# Patient Record
Sex: MALE | Race: WHITE | Employment: OTHER | URBAN - METROPOLITAN AREA
[De-identification: names, ages, dates, MRNs, and addresses within clinical notes are randomized per-mention and may not be internally consistent; named-entity substitution may affect disease eponyms.]

---

## 2017-03-10 ENCOUNTER — HOSPITAL ENCOUNTER (OUTPATIENT)
Dept: SURGERY | Age: 67
Discharge: HOME | End: 2017-03-10
Attending: OPHTHALMOLOGY | Admitting: OPHTHALMOLOGY

## 2017-03-10 VITALS
OXYGEN SATURATION: 96 % | SYSTOLIC BLOOD PRESSURE: 137 MMHG | BODY MASS INDEX: 30.29 KG/M2 | RESPIRATION RATE: 16 BRPM | DIASTOLIC BLOOD PRESSURE: 84 MMHG | WEIGHT: 193 LBS | TEMPERATURE: 97.9 F | HEART RATE: 61 BPM | HEIGHT: 67 IN

## 2017-03-10 RX ORDER — SODIUM CHLORIDE 0.9 % (FLUSH) 0.9 %
10 SYRINGE (ML) INJECTION EVERY 12 HOURS SCHEDULED
Status: DISCONTINUED | OUTPATIENT
Start: 2017-03-10 | End: 2017-03-11 | Stop reason: HOSPADM

## 2017-03-10 RX ORDER — LIDOCAINE HYDROCHLORIDE 10 MG/ML
1 INJECTION, SOLUTION EPIDURAL; INFILTRATION; INTRACAUDAL; PERINEURAL
Status: ACTIVE | OUTPATIENT
Start: 2017-03-10 | End: 2017-03-10

## 2017-03-10 RX ORDER — SODIUM CHLORIDE 0.9 % (FLUSH) 0.9 %
10 SYRINGE (ML) INJECTION PRN
Status: DISCONTINUED | OUTPATIENT
Start: 2017-03-10 | End: 2017-03-11 | Stop reason: HOSPADM

## 2017-03-10 RX ORDER — SODIUM CHLORIDE, SODIUM LACTATE, POTASSIUM CHLORIDE, CALCIUM CHLORIDE 600; 310; 30; 20 MG/100ML; MG/100ML; MG/100ML; MG/100ML
INJECTION, SOLUTION INTRAVENOUS CONTINUOUS
Status: DISCONTINUED | OUTPATIENT
Start: 2017-03-10 | End: 2017-03-11 | Stop reason: HOSPADM

## 2017-03-10 RX ADMIN — SODIUM CHLORIDE, SODIUM LACTATE, POTASSIUM CHLORIDE, CALCIUM CHLORIDE: 600; 310; 30; 20 INJECTION, SOLUTION INTRAVENOUS at 07:46

## 2017-03-10 ASSESSMENT — PAIN SCALES - GENERAL: PAINLEVEL_OUTOF10: 0

## 2017-03-10 ASSESSMENT — PAIN - FUNCTIONAL ASSESSMENT: PAIN_FUNCTIONAL_ASSESSMENT: 0-10

## 2017-03-27 ENCOUNTER — OFFICE VISIT (OUTPATIENT)
Dept: ORTHOPEDIC SURGERY | Age: 67
End: 2017-03-27

## 2017-03-27 VITALS
SYSTOLIC BLOOD PRESSURE: 152 MMHG | BODY MASS INDEX: 30.28 KG/M2 | HEART RATE: 72 BPM | HEIGHT: 67 IN | DIASTOLIC BLOOD PRESSURE: 83 MMHG | WEIGHT: 192.9 LBS

## 2017-03-27 DIAGNOSIS — M25.511 ACUTE PAIN OF RIGHT SHOULDER: Primary | ICD-10-CM

## 2017-03-27 PROCEDURE — 73030 X-RAY EXAM OF SHOULDER: CPT | Performed by: ORTHOPAEDIC SURGERY

## 2017-03-27 PROCEDURE — G8417 CALC BMI ABV UP PARAM F/U: HCPCS | Performed by: ORTHOPAEDIC SURGERY

## 2017-03-27 PROCEDURE — G8427 DOCREV CUR MEDS BY ELIG CLIN: HCPCS | Performed by: ORTHOPAEDIC SURGERY

## 2017-03-27 PROCEDURE — 1036F TOBACCO NON-USER: CPT | Performed by: ORTHOPAEDIC SURGERY

## 2017-03-27 PROCEDURE — G8598 ASA/ANTIPLAT THER USED: HCPCS | Performed by: ORTHOPAEDIC SURGERY

## 2017-03-27 PROCEDURE — 4040F PNEUMOC VAC/ADMIN/RCVD: CPT | Performed by: ORTHOPAEDIC SURGERY

## 2017-03-27 PROCEDURE — 3017F COLORECTAL CA SCREEN DOC REV: CPT | Performed by: ORTHOPAEDIC SURGERY

## 2017-03-27 PROCEDURE — 99213 OFFICE O/P EST LOW 20 MIN: CPT | Performed by: ORTHOPAEDIC SURGERY

## 2017-03-27 PROCEDURE — G8484 FLU IMMUNIZE NO ADMIN: HCPCS | Performed by: ORTHOPAEDIC SURGERY

## 2017-03-27 PROCEDURE — 1123F ACP DISCUSS/DSCN MKR DOCD: CPT | Performed by: ORTHOPAEDIC SURGERY

## 2017-03-27 RX ORDER — MELOXICAM 15 MG/1
15 TABLET ORAL DAILY
Qty: 30 TABLET | Refills: 3 | Status: SHIPPED | OUTPATIENT
Start: 2017-03-27 | End: 2017-05-22 | Stop reason: ALTCHOICE

## 2017-03-27 RX ORDER — CYCLOBENZAPRINE HCL 10 MG
TABLET ORAL
Qty: 30 TABLET | Refills: 1 | Status: SHIPPED | OUTPATIENT
Start: 2017-03-27 | End: 2017-05-22 | Stop reason: ALTCHOICE

## 2017-05-22 ENCOUNTER — OFFICE VISIT (OUTPATIENT)
Dept: ORTHOPEDIC SURGERY | Age: 67
End: 2017-05-22

## 2017-05-22 VITALS — DIASTOLIC BLOOD PRESSURE: 95 MMHG | SYSTOLIC BLOOD PRESSURE: 154 MMHG | HEART RATE: 65 BPM

## 2017-05-22 DIAGNOSIS — S46.012D ROTATOR CUFF STRAIN, LEFT, SUBSEQUENT ENCOUNTER: Primary | ICD-10-CM

## 2017-05-22 PROCEDURE — G8417 CALC BMI ABV UP PARAM F/U: HCPCS | Performed by: ORTHOPAEDIC SURGERY

## 2017-05-22 PROCEDURE — G8427 DOCREV CUR MEDS BY ELIG CLIN: HCPCS | Performed by: ORTHOPAEDIC SURGERY

## 2017-05-22 PROCEDURE — 1036F TOBACCO NON-USER: CPT | Performed by: ORTHOPAEDIC SURGERY

## 2017-05-22 PROCEDURE — 1123F ACP DISCUSS/DSCN MKR DOCD: CPT | Performed by: ORTHOPAEDIC SURGERY

## 2017-05-22 PROCEDURE — 4040F PNEUMOC VAC/ADMIN/RCVD: CPT | Performed by: ORTHOPAEDIC SURGERY

## 2017-05-22 PROCEDURE — 99213 OFFICE O/P EST LOW 20 MIN: CPT | Performed by: ORTHOPAEDIC SURGERY

## 2017-05-22 PROCEDURE — G8598 ASA/ANTIPLAT THER USED: HCPCS | Performed by: ORTHOPAEDIC SURGERY

## 2017-05-22 PROCEDURE — 3017F COLORECTAL CA SCREEN DOC REV: CPT | Performed by: ORTHOPAEDIC SURGERY

## 2017-05-25 ENCOUNTER — HOSPITAL ENCOUNTER (OUTPATIENT)
Dept: MRI IMAGING | Age: 67
Discharge: HOME | End: 2017-05-25
Attending: ORTHOPAEDIC SURGERY | Admitting: ORTHOPAEDIC SURGERY

## 2017-05-25 DIAGNOSIS — S46.012D ROTATOR CUFF STRAIN, LEFT, SUBSEQUENT ENCOUNTER: ICD-10-CM

## 2017-06-09 ENCOUNTER — OFFICE VISIT (OUTPATIENT)
Dept: ORTHOPEDIC SURGERY | Age: 67
End: 2017-06-09

## 2017-06-09 VITALS — BODY MASS INDEX: 30.29 KG/M2 | WEIGHT: 193 LBS | HEIGHT: 67 IN

## 2017-06-09 DIAGNOSIS — S46.012A ROTATOR CUFF STRAIN, LEFT, INITIAL ENCOUNTER: ICD-10-CM

## 2017-06-09 DIAGNOSIS — M25.512 PAIN OF LEFT SHOULDER REGION: Primary | ICD-10-CM

## 2017-06-09 PROCEDURE — 73030 X-RAY EXAM OF SHOULDER: CPT | Performed by: PHYSICIAN ASSISTANT

## 2017-06-09 PROCEDURE — G8598 ASA/ANTIPLAT THER USED: HCPCS | Performed by: PHYSICIAN ASSISTANT

## 2017-06-09 PROCEDURE — 3017F COLORECTAL CA SCREEN DOC REV: CPT | Performed by: PHYSICIAN ASSISTANT

## 2017-06-09 PROCEDURE — 99213 OFFICE O/P EST LOW 20 MIN: CPT | Performed by: PHYSICIAN ASSISTANT

## 2017-06-09 PROCEDURE — G8427 DOCREV CUR MEDS BY ELIG CLIN: HCPCS | Performed by: PHYSICIAN ASSISTANT

## 2017-06-09 PROCEDURE — L3670 SO ACRO/CLAV CAN WEB PRE OTS: HCPCS | Performed by: PHYSICIAN ASSISTANT

## 2017-06-09 PROCEDURE — 4040F PNEUMOC VAC/ADMIN/RCVD: CPT | Performed by: PHYSICIAN ASSISTANT

## 2017-06-09 PROCEDURE — 1123F ACP DISCUSS/DSCN MKR DOCD: CPT | Performed by: PHYSICIAN ASSISTANT

## 2017-06-09 PROCEDURE — G8417 CALC BMI ABV UP PARAM F/U: HCPCS | Performed by: PHYSICIAN ASSISTANT

## 2017-06-09 PROCEDURE — 1036F TOBACCO NON-USER: CPT | Performed by: PHYSICIAN ASSISTANT

## 2017-09-26 DIAGNOSIS — M75.122 COMPLETE TEAR OF LEFT ROTATOR CUFF: ICD-10-CM

## 2017-10-11 ENCOUNTER — HOSPITAL ENCOUNTER (OUTPATIENT)
Dept: SURGERY | Age: 67
Discharge: HOME | End: 2017-10-11
Attending: ORTHOPAEDIC SURGERY | Admitting: ORTHOPAEDIC SURGERY

## 2017-10-11 VITALS
HEART RATE: 85 BPM | WEIGHT: 190 LBS | BODY MASS INDEX: 29.82 KG/M2 | TEMPERATURE: 97 F | HEIGHT: 67 IN | DIASTOLIC BLOOD PRESSURE: 77 MMHG | OXYGEN SATURATION: 94 % | RESPIRATION RATE: 13 BRPM | SYSTOLIC BLOOD PRESSURE: 139 MMHG

## 2017-10-11 RX ORDER — MORPHINE SULFATE 2 MG/ML
1 INJECTION, SOLUTION INTRAMUSCULAR; INTRAVENOUS EVERY 5 MIN PRN
Status: DISCONTINUED | OUTPATIENT
Start: 2017-10-11 | End: 2017-10-12 | Stop reason: HOSPADM

## 2017-10-11 RX ORDER — LIDOCAINE HYDROCHLORIDE 10 MG/ML
0.3 INJECTION, SOLUTION EPIDURAL; INFILTRATION; INTRACAUDAL; PERINEURAL
Status: ACTIVE | OUTPATIENT
Start: 2017-10-11 | End: 2017-10-11

## 2017-10-11 RX ORDER — SODIUM CHLORIDE, SODIUM LACTATE, POTASSIUM CHLORIDE, CALCIUM CHLORIDE 600; 310; 30; 20 MG/100ML; MG/100ML; MG/100ML; MG/100ML
INJECTION, SOLUTION INTRAVENOUS CONTINUOUS
Status: DISCONTINUED | OUTPATIENT
Start: 2017-10-11 | End: 2017-10-12 | Stop reason: HOSPADM

## 2017-10-11 RX ORDER — DIPHENHYDRAMINE HYDROCHLORIDE 50 MG/ML
12.5 INJECTION INTRAMUSCULAR; INTRAVENOUS
Status: ACTIVE | OUTPATIENT
Start: 2017-10-11 | End: 2017-10-11

## 2017-10-11 RX ORDER — OXYCODONE HYDROCHLORIDE AND ACETAMINOPHEN 5; 325 MG/1; MG/1
2 TABLET ORAL PRN
Status: ACTIVE | OUTPATIENT
Start: 2017-10-11 | End: 2017-10-11

## 2017-10-11 RX ORDER — OXYCODONE HYDROCHLORIDE AND ACETAMINOPHEN 5; 325 MG/1; MG/1
1 TABLET ORAL PRN
Status: ACTIVE | OUTPATIENT
Start: 2017-10-11 | End: 2017-10-11

## 2017-10-11 RX ORDER — PROMETHAZINE HYDROCHLORIDE 25 MG/ML
6.25 INJECTION, SOLUTION INTRAMUSCULAR; INTRAVENOUS
Status: ACTIVE | OUTPATIENT
Start: 2017-10-11 | End: 2017-10-11

## 2017-10-11 RX ORDER — SODIUM CHLORIDE 0.9 % (FLUSH) 0.9 %
10 SYRINGE (ML) INJECTION PRN
Status: DISCONTINUED | OUTPATIENT
Start: 2017-10-11 | End: 2017-10-12 | Stop reason: HOSPADM

## 2017-10-11 RX ORDER — OXYCODONE HYDROCHLORIDE AND ACETAMINOPHEN 5; 325 MG/1; MG/1
1 TABLET ORAL EVERY 6 HOURS PRN
Qty: 28 TABLET | Refills: 0 | Status: SHIPPED | OUTPATIENT
Start: 2017-10-11 | End: 2017-10-11 | Stop reason: HOSPADM

## 2017-10-11 RX ORDER — ONDANSETRON 2 MG/ML
4 INJECTION INTRAMUSCULAR; INTRAVENOUS
Status: ACTIVE | OUTPATIENT
Start: 2017-10-11 | End: 2017-10-11

## 2017-10-11 RX ORDER — ACETAMINOPHEN 10 MG/ML
1000 INJECTION, SOLUTION INTRAVENOUS ONCE
Status: COMPLETED | OUTPATIENT
Start: 2017-10-11 | End: 2017-10-11

## 2017-10-11 RX ORDER — MORPHINE SULFATE 2 MG/ML
2 INJECTION, SOLUTION INTRAMUSCULAR; INTRAVENOUS EVERY 5 MIN PRN
Status: DISCONTINUED | OUTPATIENT
Start: 2017-10-11 | End: 2017-10-12 | Stop reason: HOSPADM

## 2017-10-11 RX ORDER — SODIUM CHLORIDE 0.9 % (FLUSH) 0.9 %
10 SYRINGE (ML) INJECTION EVERY 12 HOURS SCHEDULED
Status: DISCONTINUED | OUTPATIENT
Start: 2017-10-11 | End: 2017-10-12 | Stop reason: HOSPADM

## 2017-10-11 RX ORDER — MEPERIDINE HYDROCHLORIDE 50 MG/ML
12.5 INJECTION INTRAMUSCULAR; INTRAVENOUS; SUBCUTANEOUS EVERY 5 MIN PRN
Status: DISCONTINUED | OUTPATIENT
Start: 2017-10-11 | End: 2017-10-12 | Stop reason: HOSPADM

## 2017-10-11 RX ORDER — LABETALOL HYDROCHLORIDE 5 MG/ML
5 INJECTION, SOLUTION INTRAVENOUS EVERY 10 MIN PRN
Status: DISCONTINUED | OUTPATIENT
Start: 2017-10-11 | End: 2017-10-12 | Stop reason: HOSPADM

## 2017-10-11 RX ORDER — HYDRALAZINE HYDROCHLORIDE 20 MG/ML
5 INJECTION INTRAMUSCULAR; INTRAVENOUS EVERY 10 MIN PRN
Status: DISCONTINUED | OUTPATIENT
Start: 2017-10-11 | End: 2017-10-12 | Stop reason: HOSPADM

## 2017-10-11 RX ORDER — HYDROCODONE BITARTRATE AND ACETAMINOPHEN 5; 325 MG/1; MG/1
1 TABLET ORAL EVERY 4 HOURS PRN
Qty: 42 TABLET | Refills: 0 | Status: SHIPPED | OUTPATIENT
Start: 2017-10-11 | End: 2017-10-18

## 2017-10-11 RX ADMIN — ACETAMINOPHEN 1000 MG: 10 INJECTION, SOLUTION INTRAVENOUS at 06:55

## 2017-10-11 RX ADMIN — SODIUM CHLORIDE, SODIUM LACTATE, POTASSIUM CHLORIDE, CALCIUM CHLORIDE: 600; 310; 30; 20 INJECTION, SOLUTION INTRAVENOUS at 06:54

## 2017-10-11 ASSESSMENT — PAIN SCALES - GENERAL
PAINLEVEL_OUTOF10: 0

## 2017-10-11 ASSESSMENT — PAIN - FUNCTIONAL ASSESSMENT: PAIN_FUNCTIONAL_ASSESSMENT: 0-10

## 2017-10-11 NOTE — PROGRESS NOTES
Block Time Out with MAGNUS Mitchell      Verified   Correct Pt.   Correct   Correct Procedure  Correct Site  Correct Extremity

## 2017-10-11 NOTE — PROGRESS NOTES
Patient ready for discharge now. Needs prescription changed. Percocet makes patient sick. Waiting for Dr. Mattie Woodruff to come out of O.R. To vic different prescription.

## 2017-10-11 NOTE — OP NOTE
Diagnostic Shoulder Arthroscopy with Rotator Cuff Repair, Biceps Tenodesis and Subacromial Decompression       Kayla Gonzalez (1950)    Date of Surgery- 10/11/2017      Preoperative Diagnosis-   1. Recurrent rotator cuff tear left shoulder           2. Outlet impingement left shoulder                                            Postoperative Diagnosis-  1. Recurrent rotator cuff tear left shoulder           2. Outlet impingement left shoulder       3. Long head biceps tendon tear    Procedure-   1. Diagnostic arthroscopy left shoulder             2.  Arthroscopic rotator cuff repair left shoulder ([id number])    3. Subacromial decompression (CPT- [id number])    4. Arthroscopic Long head biceps tenodesis ([id number])      Surgeon-  Dian Mariee    Primary Assistant- Pippa Barroso PA-C, ATC. Anesthesia- [facility]    Implants- Depuy Mitek Healix 4.5 mm, quantity # 2; Andreia 7 x 23 mm, quantity # 1    The physician assistant was necessary in the surgical case as described above. The presence of a skilled physician assistant was integral for adequate visualization, suture management, knot tying and wound closure. Indications for Operation  Persistent shoulder pain with an MRI confirmed rotator cuff tear. The patient chose to proceed with the aforementioned procedures. At no time were any guarantees implied or stated. Site Marking and Surgical Prep  The patient was seen in the holding area and the appropriate extremity marked with a pen. Interscalene block was administered by the anesthesia department. The patient was taken to the operative suite, identified, placed on the operating room table in the supine position. After induction of general anesthesia, the patient was placed in the beach chair position with all bony prominences adequately padded and the head and neck anatomically supported. Examination  Under Anesthesia.   Full symmetric range of motion, no instability    Diagnostic Arthroscopy  The upper extremity from the neck to fingertips was prepped with Hibiclens and alcohol and draped in the standard fashion. The forearm was well padded and secured in the [employer] Spider extremity positioning device. A standard midglenoid posterior portal was established. The trocar and cannula were inserted. The trocar was removed and the arthroscope and inflow inserted. Systematic arthroscopy was performed and the findings are summarized below. 1.  Superior Labrum/Biceps Tendon-  Extensive fraying and tearing of the  biceps tendon. No evidence of labral fraying  2. Anterior/Posterior Labrum- Intact without fraying  3. Rotator Cuff- The subscapularis tendon was intact. There was a full thickness tear of the supraspinatus tendon  4. Inferior Axillary Recess-  No loose bodies  5. Articular Surfaces-  Intact. There was some posterosuperior labral ossification    Intra-articular Debridement   None performed. Subacromial Decompression  The trocar and cannula were redirected to the subacromial space. The arthroscope and inflow were inserted. A lateral portal was established after localizing the subacromial space with a spinal needle. Using a both a shaver and a radiofrequency probe, the bursa and the tissue beneath the acromion were removed. The coracromial ligament was divided. A 5.5 mm bur was placed through the lateral portal and the anterior aspect of the acromion was removed. The arthroscope was then switched to the lateral portal and with the bur posteriorly, a smooth flat acromion was created. The arm was abducted to 90 degrees and internally and externally rotated. There was no evidence of outlet impingement. Long Head Biceps Tenodesis (Interference screw)  A suture was placed around the biceps tendon to maintain length. The biceps tendon was tenotomized from its origin on the supraglenoid tubercle.   With the arthroscope in the lateral portal, the bicipital sheath was identified and opened. Hemostasis was controlled with electrocautery. An accessory portal was made above the axillary crease. A guide pin was drilled distal to the bicipital groove. An acorn reamer was used to create a 25-30 mm socket. A bur was used to create a small notch in the inferior socket. The tendon fork was used to guide the tendon into the socket. A wire was placed through the cannulated fork to maintain the tendon within the socket. A Andreia interference screw was carefully inserted taking care not to damage the tendon. The tendon was probed to confirm that the tendon was securely fixed within the socket. The remaining proximal tendon was excised    Rotator Cuff Repair    Hypertrophic bursa was debrided. A recurrent, delaminated supraspinatus tendon tear was identified. There was some soft tissue loss but the remaining tissue was fair in quality. The greater tuberosity was abraded to a bleeding bony surface. Two triple loaded, 4.5 mm anchors were placed in the mid portion of the greater tuberosity. The suture limbs were retrieved in a simple fashion using a locking lasso stitch. Sutures were isolated and tied using a series of alternating half hitches reversing both the post and loop. The repair was under minimal tension. The arm was abducted, internally and externally rotated. Threre was no evidence of impingement. Closure  The shoulder was drained. Arthroscopic equipment was removed and the portals closed with 3-0 Nylon. Sterile dressings were applied. A sling was applied. The patient was awakened and taken to the postoperative area in stable condition.

## 2017-10-11 NOTE — PROCEDURES
Zaire Veliz   1950  78 y.o. INTERSCALENE NERVE BLOCK NOTE    Pt agrees to risks, benefits and alternatives of block. Surgical procedure: shuolder  Side: left  Requested per Dr. Romulo Meneses done with :  Pt and Lorelei    Pt sedated with Versed 4mg                            Fentanyl 0ug      Propofol 0  Monitor on  Supine  Prepped with alcohol     No-Lidocaine 1% used for topical local anesthetic     No-  24 gauge 25 mm Stimuplex needle used     Yes- 22 gauge 50 mm Stimuplex needle used    Initial stimulation of 1.5 ma at 2 HZ decreased to 0.6 ma before loss of stimulation. Good stimulation of shoulder/arm. Bupivacaine . 50% 35cc injected in 5 cc increments after negative aspiration each time. Yes- Epinephrine 1:200,000 included plus decadron 10 mg  Pt tolerated procedure well. No complications.   Comments:       Denzel Fairchild

## 2017-10-11 NOTE — PROGRESS NOTES
Pt tolerated LUE       Nerve block well  Without adverse reactions/ no dizziness/tinnitus/ pain- pt was premedicated with          4mg        Versed prior to receiving the nerve block. VSS- Sao2 monitored during the entire procedure- maintained WNL during block procedure.  95% on room air
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
## 2017-10-16 ENCOUNTER — OFFICE VISIT (OUTPATIENT)
Dept: ORTHOPEDIC SURGERY | Age: 67
End: 2017-10-16

## 2017-10-16 ENCOUNTER — HOSPITAL ENCOUNTER (OUTPATIENT)
Dept: PHYSICAL THERAPY | Age: 67
Discharge: HOME | End: 2017-09-30
Admitting: ORTHOPAEDIC SURGERY

## 2017-10-16 ENCOUNTER — HOSPITAL ENCOUNTER (OUTPATIENT)
Dept: PHYSICAL THERAPY | Age: 67
Discharge: HOME | End: 2017-10-16
Admitting: ORTHOPAEDIC SURGERY

## 2017-10-16 VITALS — HEIGHT: 67 IN | BODY MASS INDEX: 29.83 KG/M2 | WEIGHT: 190.04 LBS

## 2017-10-16 DIAGNOSIS — Z98.890 S/P ROTATOR CUFF REPAIR: Primary | ICD-10-CM

## 2017-10-16 PROCEDURE — 99024 POSTOP FOLLOW-UP VISIT: CPT | Performed by: ORTHOPAEDIC SURGERY

## 2017-10-16 NOTE — FLOWSHEET NOTE
for proper ROM for normal function with self care, reaching, carrying, lifting, house/yardwork, driving/computer work    Modalities:  Electrical Stimulation to L shoulder for pain control. Waveform: Premod; Cycle Time: Continuous; Frequency: 80/150 Hz; Treatment time: 10 min with ice. Charges:  Timed Code Treatment Minutes: 25   Total Treatment Minutes: 50     [x] EVAL (LOW) 29069 (typically 20 minutes face-to-face)  [x] SC(73767) x  2   [] IONTO  [] NMR (79525) x      [] VASO  [] Manual (90826) x       [] Other:  [] TA x       [] Mech Traction (72720)  [] ES(attended) (92293)      [x] ES (un) (28680):     GOALS:  Patient stated goal: use of LUE     Therapist goals for Patient:   Short Term Goals: To be achieved in: 2 weeks  1. Independent in HEP and progression per patient tolerance, in order to prevent re-injury. 2. Patient will have a decrease in pain to facilitate improvement in movement, function, and ADLs as indicated by Functional Deficits.     Long Term Goals: To be achieved in: 12 weeks  1. Disability index score of 40% or less for the DASH to assist with reaching prior level of function. 2. Patient will demonstrate increased AROM to WNL to allow for proper joint functioning as indicated by patients Functional Deficits. 3. Patient will demonstrate an increase in Strength to ?4+/5 to allow for proper functional mobility as indicated by patients Functional Deficits. 4. Patient will return to all functional activities without increased symptoms or restriction. 5. Pt will D/C sling and exhibit normal use of LUE with no ? pain (patient specific functional goal)                   Progression Towards Functional goals:  [] Patient is progressing as expected towards functional goals listed. [] Progression is slowed due to complexities listed. [] Progression has been slowed due to co-morbidities.   [x] Plan just implemented, too soon to assess goals progression  [] Other:     ASSESSMENT:  See eval    Treatment/Activity Tolerance:  [x] Patient tolerated treatment well [] Patient limited by fatique  [] Patient limited by pain  [] Patient limited by other medical complications  [] Other:     Prognosis: [x] Good [] Fair  [] Poor    Patient Requires Follow-up: [x] Yes  [] No    PLAN: See eval  [] Continue per plan of care [] Alter current plan (see comments)  [x] Plan of care initiated [] Hold pending MD visit [] Discharge    Electronically signed by: Joshua Shukla PT

## 2017-10-16 NOTE — PLAN OF CARE
Objects Goal Status (): At least 20 percent but less than 40 percent impaired, limited or restricted    ASSESSMENT:   Functional Impairments   [x]Noted spinal or UE joint hypomobility   []Noted spinal or UE joint hypermobility   [x]Decreased UE functional ROM   [x]Decreased UE functional strength   [x]Abnormal reflexes/sensation/myotomal/dermatomal deficits   [x]Decreased RC/scapular/core strength and neuromuscular control   []other:      Functional Activity Limitations (from functional questionnaire and intake)   [x]Reduced ability to tolerate prolonged functional positions   [x]Reduced ability or difficulty with changes of positions or transfers between positions   [x]Reduced ability to maintain good posture and demonstrate good body mechanics with sitting, bending, and lifting   [x] Reduced ability or tolerance with driving and/or computer work   [x]Reduced ability to sleep   [x]Reduced ability to perform lifting, reaching, carrying tasks   [x]Reduced ability to tolerate impact through UE   [x]Reduced ability to reach behind back   [x]Reduced ability to  or hold objects   [x]Reduced ability to throw or toss an object   []other:    Participation Restrictions   [x]Reduced participation in self care activities   [x]Reduced participation in home management activities   [x]Reduced participation in work activities   [x]Reduced participation in social activities. [x]Reduced participation in sport/recreation activities. Classification:   [x]Signs/symptoms consistent with post-surgical status including decreased ROM, strength and function.   []Signs/symptoms consistent with joint sprain/strain   []Signs/symptoms consistent with shoulder impingement   []Signs/symptoms consistent with shoulder/elbow/wrist tendinopathy   []Signs/symptoms consistent with Rotator cuff tear   []Signs/symptoms consistent with labral tear   []Signs/symptoms consistent with postural dysfunction    []Signs/symptoms consistent with Glenohumeral IR Deficit - <45 degrees   []Signs/symptoms consistent with facet dysfunction of cervical/thoracic spine    []Signs/symptoms consistent with pathology which may benefit from Dry needling     []other:     Prognosis/Rehab Potential:      []Excellent   [x]Good    []Fair   []Poor    Tolerance of evaluation/treatment:    []Excellent   [x]Good    []Fair   []Poor    Physical Therapy Evaluation Complexity Justification  [x] A history of present problem with:  [] no personal factors and/or comorbidities that impact the plan of care;  [x]1-2 personal factors and/or comorbidities that impact the plan of care  []3 personal factors and/or comorbidities that impact the plan of care  [x] An examination of body systems using standardized tests and measures addressing any of the following: body structures and functions (impairments), activity limitations, and/or participation restrictions;:  [] a total of 1-2 or more elements   [x] a total of 3 or more elements   [] a total of 4 or more elements   [x] A clinical presentation with:  [x] stable and/or uncomplicated characteristics   [] evolving clinical presentation with changing characteristics  [] unstable and unpredictable characteristics;   [x] Clinical decision making of [x] low, [] moderate, [] high complexity using standardized patient assessment instrument and/or measurable assessment of functional outcome.     [x] EVAL (LOW) 18117 (typically 20 minutes face-to-face)  [] EVAL (MOD) 89442 (typically 30 minutes face-to-face)  [] EVAL (HIGH) 37729 (typically 45 minutes face-to-face)  [] RE-EVAL     PLAN:  Frequency/Duration:  1-2 days per week for 12 Weeks:  INTERVENTIONS:  [x] Therapeutic exercise including: strength training, ROM, for Upper extremity and core   [x]  NMR activation and proprioception for UE, scap and Core   [x] Manual therapy as indicated for shoulder, scapula and spine to include: Dry Needling/IASTM, STM, PROM, Gr I-IV mobilizations, manipulation. [x] Modalities as needed that may include: thermal agents, E-stim, Biofeedback, US, iontophoresis as indicated  [x] Patient education on joint protection, postural re-education, activity modification, progression of HEP. HEP instruction: Pt provided with handout and demonstrated and verbalized understanding. (see scanned forms)    GOALS:  Patient stated goal: use of LUE    Therapist goals for Patient:   Short Term Goals: To be achieved in: 2 weeks  1. Independent in HEP and progression per patient tolerance, in order to prevent re-injury. 2. Patient will have a decrease in pain to facilitate improvement in movement, function, and ADLs as indicated by Functional Deficits. Long Term Goals: To be achieved in: 12 weeks  1. Disability index score of 40% or less for the DASH to assist with reaching prior level of function. 2. Patient will demonstrate increased AROM to WNL to allow for proper joint functioning as indicated by patients Functional Deficits. 3. Patient will demonstrate an increase in Strength to ?4+/5 to allow for proper functional mobility as indicated by patients Functional Deficits. 4. Patient will return to all functional activities without increased symptoms or restriction.    5. Pt will D/C sling and exhibit normal use of LUE with no ? pain (patient specific functional goal)       Electronically signed by:  Luellen Libman, PT

## 2017-10-18 ENCOUNTER — HOSPITAL ENCOUNTER (OUTPATIENT)
Dept: PHYSICAL THERAPY | Age: 67
Discharge: HOME | End: 2017-10-18
Admitting: ORTHOPAEDIC SURGERY

## 2017-10-18 NOTE — FLOWSHEET NOTE
Hz; Treatment time: 10 min with ice. Charges:  Timed Code Treatment Minutes: 34   Total Treatment Minutes: 44     [] EVAL (LOW) 66754 (typically 20 minutes face-to-face)  [x] KG(24665) x  1   [] IONTO  [] NMR (52482) x      [] VASO  [x] Manual (04013) x  1    [] Other:  [] TA x       [] Mech Traction (47030)  [] ES(attended) (20009)      [x] ES (un) (52645):     GOALS:  Patient stated goal: use of LUE     Therapist goals for Patient:   Short Term Goals: To be achieved in: 2 weeks  1. Independent in HEP and progression per patient tolerance, in order to prevent re-injury. 2. Patient will have a decrease in pain to facilitate improvement in movement, function, and ADLs as indicated by Functional Deficits.     Long Term Goals: To be achieved in: 12 weeks  1. Disability index score of 40% or less for the DASH to assist with reaching prior level of function. 2. Patient will demonstrate increased AROM to WNL to allow for proper joint functioning as indicated by patients Functional Deficits. 3. Patient will demonstrate an increase in Strength to ?4+/5 to allow for proper functional mobility as indicated by patients Functional Deficits. 4. Patient will return to all functional activities without increased symptoms or restriction. 5. Pt will D/C sling and exhibit normal use of LUE with no ? pain (patient specific functional goal)                   Progression Towards Functional goals:  [] Patient is progressing as expected towards functional goals listed. [] Progression is slowed due to complexities listed. [] Progression has been slowed due to co-morbidities. [x] Plan just implemented, too soon to assess goals progression  [] Other:     ASSESSMENT:  Pt tolerated treatment well exhibiting good ROM. Pt will continue to benefit from ? ROM, gentle strengthening as tolerated and ? myofascial tightness throughout pedro scap musculature.      Treatment/Activity Tolerance:  [x] Patient tolerated treatment well [] Patient limited by fatique  [] Patient limited by pain  [] Patient limited by other medical complications  [] Other:     Prognosis: [x] Good [] Fair  [] Poor    Patient Requires Follow-up: [x] Yes  [] No    PLAN: See eval  [x] Continue per plan of care [] Alter current plan (see comments)  [] Plan of care initiated [] Hold pending MD visit [] Discharge    Electronically signed by: Marine Najjar PT

## 2017-10-23 ENCOUNTER — HOSPITAL ENCOUNTER (OUTPATIENT)
Dept: PHYSICAL THERAPY | Age: 67
Discharge: HOME | End: 2017-10-23
Admitting: ORTHOPAEDIC SURGERY

## 2017-10-23 NOTE — FLOWSHEET NOTE
[address] [place], [address]                Physical Therapy Daily Treatment Note  Date:  10/23/2017    Patient Name:  Denise Lane    :  1950  MRN: [id number]  Restrictions/Precautions:    Medical/Treatment Diagnosis Information:  · Diagnosis: Z98.890 L RTC repair, M25.512 L shoulder pain  · Treatment Diagnosis: s/p L RTC repair, SAD, biceps tenodesis   Insurance/Certification information:   Medicare  Physician Information:  Referring Practitioner: Dr. Elodia Garcia of care signed (Y/N): Y    Date of Patient follow up with Physician:     G-Code (if applicable):      Date G-Code Applied:  10/16/17       Progress Note: []  Yes  [x]  No  Next due by: Visit #10      Latex Allergy:  [x]NO      []YES  Preferred Language for Healthcare:   [x]English       []other:    Visit # Insurance Allowable   3 Med nec     Pain level:  2-3/10     SUBJECTIVE:  Pt reports his shoulder feels good overall.       OBJECTIVE:   Observation:    Pt has sling donned appropriately   Test measurements:     10/18: Flex: 128 ° , ER: 34 °     RESTRICTIONS/PRECAUTIONS: sling x6 wks, PROM only x6 wks    Exercises/Interventions:   Therapeutic Ex Sets/sec Reps Notes HEP   Seated self ER S 5\" 10  x   Table slide flexion 10\" 10  x   Shrugs 3 10  x   Scap squeeze 5\" 10  x   Assisted bicep curl 3 10  x   Wrist and hand ROM    x   Iso: ER/IR, ext, ABD 5\" 10  x    2 30 green                                                                   Pt edu:               Manual Intervention       PROM  8'      STM pec, UT, pedro scap 4'                                         NMR re-education                                                                   Therapeutic Exercise and NMR EXR  [x] (72723) Provided verbal/tactile cueing for activities related to strengthening, flexibility, endurance, ROM  for improvements in scapular, scapulothoracic with ice. Charges:  Timed Code Treatment Minutes: 34   Total Treatment Minutes: 44     [] EVAL (LOW) 95817 (typically 20 minutes face-to-face)  [x] ZQ(88072) x  1   [] IONTO  [] NMR (59479) x      [] VASO  [x] Manual (68693) x  1    [] Other:  [] TA x       [] Mech Traction (16831)  [] ES(attended) (33012)      [x] ES (un) (58726):     GOALS:  Patient stated goal: use of LUE     Therapist goals for Patient:   Short Term Goals: To be achieved in: 2 weeks  1. Independent in HEP and progression per patient tolerance, in order to prevent re-injury. 2. Patient will have a decrease in pain to facilitate improvement in movement, function, and ADLs as indicated by Functional Deficits.     Long Term Goals: To be achieved in: 12 weeks  1. Disability index score of 40% or less for the DASH to assist with reaching prior level of function. 2. Patient will demonstrate increased AROM to WNL to allow for proper joint functioning as indicated by patients Functional Deficits. 3. Patient will demonstrate an increase in Strength to ?4+/5 to allow for proper functional mobility as indicated by patients Functional Deficits. 4. Patient will return to all functional activities without increased symptoms or restriction. 5. Pt will D/C sling and exhibit normal use of LUE with no ? pain (patient specific functional goal)                   Progression Towards Functional goals:  [x] Patient is progressing as expected towards functional goals listed. [] Progression is slowed due to complexities listed. [] Progression has been slowed due to co-morbidities. [] Plan just implemented, too soon to assess goals progression  [] Other:     ASSESSMENT:  Pt tolerated treatment well exhibiting good ROM. Pt will continue to benefit from ? ROM, gentle strengthening as tolerated and ? myofascial tightness throughout pedro scap musculature.      Treatment/Activity Tolerance:  [x] Patient tolerated treatment well [] Patient limited by

## 2017-10-25 ENCOUNTER — HOSPITAL ENCOUNTER (OUTPATIENT)
Dept: PHYSICAL THERAPY | Age: 67
Discharge: HOME | End: 2017-10-25
Admitting: ORTHOPAEDIC SURGERY

## 2017-10-25 NOTE — FLOWSHEET NOTE
[address] [place], [address]                Physical Therapy Daily Treatment Note  Date:  10/25/2017    Patient Name:  Huber Guerra    :  1950  MRN: [id number]  Restrictions/Precautions:    Medical/Treatment Diagnosis Information:  · Diagnosis: Z98.890 L RTC repair, M25.512 L shoulder pain  · Treatment Diagnosis: s/p L RTC repair, SAD, biceps tenodesis   Insurance/Certification information:   Medicare  Physician Information:  Referring Practitioner: Dr. Adalberto Taylor of care signed (Y/N): Y    Date of Patient follow up with Physician:     G-Code (if applicable):      Date G-Code Applied:  10/16/17       Progress Note: []  Yes  [x]  No  Next due by: Visit #10      Latex Allergy:  [x]NO      []YES  Preferred Language for Healthcare:   [x]English       []other:    Visit # Insurance Allowable   4 Med nec     Pain level:  1-2/10     SUBJECTIVE:  Pt reports no new complaints.      OBJECTIVE:   Observation:    Pt has sling donned appropriately   Test measurements:     10/18: Flex: 128 ° , ER: 34 °     RESTRICTIONS/PRECAUTIONS: sling x6 wks, PROM only x6 wks    Exercises/Interventions:   Therapeutic Ex Sets/sec Reps Notes HEP   Naima's 5'        Table ER S 5\"  5\" 10  10  X  x   Table slide flexion 10\" 20  x   Shrugs 3 10  x   Scap squeeze 5\" 10  x   Assisted bicep curl 3 10  x   Wrist and hand ROM    x   Iso: ER/IR, ext, ABD 5\" 10  x    2 30 green                                                                   Pt edu:               Manual Intervention       PROM  8'      STM pec, UT, pedro scap 4'                                         NMR re-education                                                                   Therapeutic Exercise and NMR EXR  [x] (43654) Provided verbal/tactile cueing for activities related to strengthening, flexibility, endurance, ROM  for improvements in scapular, scapulothoracic and UE control with self care, reaching, carrying, lifting, house/yardwork, driving/computer work.    [] (79821) Provided verbal/tactile cueing for activities related to improving balance, coordination, kinesthetic sense, posture, motor skill, proprioception  to assist with  scapular, scapulothoracic and UE control with self care, reaching, carrying, lifting, house/yardwork, driving/computer work. Therapeutic Activities:    [] (74885 or 31968) Provided verbal/tactile cueing for activities related to improving balance, coordination, kinesthetic sense, posture, motor skill, proprioception and motor activation to allow for proper function of scapular, scapulothoracic and UE control with self care, carrying, lifting, driving/computer work. Home Exercise Program:    [x] (29616) Reviewed/Progressed HEP activities related to strengthening, flexibility, endurance, ROM of scapular, scapulothoracic and UE control with self care, reaching, carrying, lifting, house/yardwork, driving/computer work  [] (88877) Reviewed/Progressed HEP activities related to improving balance, coordination, kinesthetic sense, posture, motor skill, proprioception of scapular, scapulothoracic and UE control with self care, reaching, carrying, lifting, house/yardwork, driving/computer work      Manual Treatments:  PROM / STM / Oscillations-Mobs:  G-I, II, III, IV (PA's, Inf., Post.)  [x] (23239) Provided manual therapy to mobilize soft tissue/joints of cervical/CT, scapular GHJ and UE for the purpose of modulating pain, promoting relaxation,  increasing ROM, reducing/eliminating soft tissue swelling/inflammation/restriction, improving soft tissue extensibility and allowing for proper ROM for normal function with self care, reaching, carrying, lifting, house/yardwork, driving/computer work    Modalities:  Electrical Stimulation to L shoulder for pain control. Waveform: Premod;  Cycle Time: Continuous; Frequency: 80/150 Hz; Patient limited by fatique  [] Patient limited by pain  [] Patient limited by other medical complications  [] Other:     Prognosis: [x] Good [] Fair  [] Poor    Patient Requires Follow-up: [x] Yes  [] No    PLAN: See eval  [x] Continue per plan of care [] Alter current plan (see comments)  [] Plan of care initiated [] Hold pending MD visit [] Discharge    Electronically signed by: Neisha Townsend PT

## 2017-11-01 ENCOUNTER — HOSPITAL ENCOUNTER (OUTPATIENT)
Dept: PHYSICAL THERAPY | Age: 67
Discharge: HOME | End: 2017-11-01
Admitting: ORTHOPAEDIC SURGERY

## 2017-11-01 ENCOUNTER — HOSPITAL ENCOUNTER (OUTPATIENT)
Dept: PHYSICAL THERAPY | Age: 67
Discharge: HOME | End: 2017-11-30
Attending: ORTHOPAEDIC SURGERY | Admitting: ORTHOPAEDIC SURGERY

## 2017-11-01 NOTE — FLOWSHEET NOTE
[facility], [address]                Physical Therapy Daily Treatment Note  Date:  2017    Patient Name:  Kayla Garcia    :  1950  MRN: [id number]  Restrictions/Precautions:    Medical/Treatment Diagnosis Information:  · Diagnosis: Z98.890 L RTC repair, M25.512 L shoulder pain  · Treatment Diagnosis: s/p L RTC repair, SAD, biceps tenodesis 60  Insurance/Certification information:   Medicare  Physician Information:  Referring Practitioner: Dr. Miroslava Reece of care signed (Y/N): Y    Date of Patient follow up with Physician:     G-Code (if applicable):      Date G-Code Applied:  10/16/17       Progress Note: []  Yes  [x]  No  Next due by: Visit #10      Latex Allergy:  [x]NO      []YES  Preferred Language for Healthcare:   [x]English       []other:    Visit # Insurance Allowable   5 Med nec     Pain level:  1-2/10     SUBJECTIVE:  Pt reports just some stiffness and soreness this morning. Pt will be 4 wks post op on .      OBJECTIVE:   Observation:    Pt has sling donned appropriately   Test measurements:     10/18: Flex: 128 ° , ER: 34 °     RESTRICTIONS/PRECAUTIONS: sling x6 wks, PROM only x6 wks    Exercises/Interventions:   Therapeutic Ex Sets/sec Reps Notes HEP   Naima's 5'        Table ER S 5\"  5\" 10  10  X  x   Table slide flexion 10\" 20  x   Shrugs 3 10  x   Scap squeeze 5\" 10  x   Assisted bicep curl  Bicep iso 3  5\" 10  10  X  x       x   Iso: ER/IR, ext, ABD, flex 5\" 10  x    2 30 green    Cane flex S 5\" 10  x                                                           Pt edu:               Manual Intervention       PROM  8'       4'                                         NMR re-education                                                                   Therapeutic Exercise and NMR EXR  [x] (82710) Provided verbal/tactile cueing for activities related to strengthening,

## 2017-11-08 ENCOUNTER — HOSPITAL ENCOUNTER (OUTPATIENT)
Dept: PHYSICAL THERAPY | Age: 67
Discharge: HOME | End: 2017-11-08
Admitting: ORTHOPAEDIC SURGERY

## 2017-11-08 NOTE — FLOWSHEET NOTE
flexibility, endurance, ROM  for improvements in scapular, scapulothoracic and UE control with self care, reaching, carrying, lifting, house/yardwork, driving/computer work.    [] (61340) Provided verbal/tactile cueing for activities related to improving balance, coordination, kinesthetic sense, posture, motor skill, proprioception  to assist with  scapular, scapulothoracic and UE control with self care, reaching, carrying, lifting, house/yardwork, driving/computer work. Therapeutic Activities:    [] (89338 or 11078) Provided verbal/tactile cueing for activities related to improving balance, coordination, kinesthetic sense, posture, motor skill, proprioception and motor activation to allow for proper function of scapular, scapulothoracic and UE control with self care, carrying, lifting, driving/computer work. Home Exercise Program:    [x] (71748) Reviewed/Progressed HEP activities related to strengthening, flexibility, endurance, ROM of scapular, scapulothoracic and UE control with self care, reaching, carrying, lifting, house/yardwork, driving/computer work  [] (52664) Reviewed/Progressed HEP activities related to improving balance, coordination, kinesthetic sense, posture, motor skill, proprioception of scapular, scapulothoracic and UE control with self care, reaching, carrying, lifting, house/yardwork, driving/computer work      Manual Treatments:  PROM / STM / Oscillations-Mobs:  G-I, II, III, IV (PA's, Inf., Post.)  [x] (13144) Provided manual therapy to mobilize soft tissue/joints of cervical/CT, scapular GHJ and UE for the purpose of modulating pain, promoting relaxation,  increasing ROM, reducing/eliminating soft tissue swelling/inflammation/restriction, improving soft tissue extensibility and allowing for proper ROM for normal function with self care, reaching, carrying, lifting, house/yardwork, driving/computer work    Modalities:  Electrical Stimulation to L shoulder for pain control.  Waveform: Premod; Cycle Time: Continuous; Frequency: 80/150 Hz; Treatment time: 10 min with ice. Charges:  Timed Code Treatment Minutes: 38   Total Treatment Minutes: 48     [] EVAL (LOW) 79459 (typically 20 minutes face-to-face)  [x] OA(59896) x  2   [] IONTO  [] NMR (64558) x      [] VASO  [x] Manual (36719) x  1    [] Other:  [] TA x       [] Mech Traction (84880)  [] ES(attended) (98275)      [x] ES (un) (24698):     GOALS:  Patient stated goal: use of LUE     Therapist goals for Patient:   Short Term Goals: To be achieved in: 2 weeks  1. Independent in HEP and progression per patient tolerance, in order to prevent re-injury. 2. Patient will have a decrease in pain to facilitate improvement in movement, function, and ADLs as indicated by Functional Deficits.     Long Term Goals: To be achieved in: 12 weeks  1. Disability index score of 40% or less for the DASH to assist with reaching prior level of function. 2. Patient will demonstrate increased AROM to WNL to allow for proper joint functioning as indicated by patients Functional Deficits. 3. Patient will demonstrate an increase in Strength to ?4+/5 to allow for proper functional mobility as indicated by patients Functional Deficits. 4. Patient will return to all functional activities without increased symptoms or restriction. 5. Pt will D/C sling and exhibit normal use of LUE with no ? pain (patient specific functional goal)                   Progression Towards Functional goals:  [x] Patient is progressing as expected towards functional goals listed. [] Progression is slowed due to complexities listed. [] Progression has been slowed due to co-morbidities. [] Plan just implemented, too soon to assess goals progression  [] Other:     ASSESSMENT:  Pt tolerated treatment well exhibiting good ROM. Pt will continue to benefit from ? ROM, gentle strengthening as tolerated and ? myofascial tightness throughout pedro scap musculature.      Treatment/Activity

## 2017-11-15 ENCOUNTER — HOSPITAL ENCOUNTER (OUTPATIENT)
Dept: PHYSICAL THERAPY | Age: 67
Discharge: HOME | End: 2017-11-15
Admitting: ORTHOPAEDIC SURGERY

## 2017-11-15 NOTE — FLOWSHEET NOTE
improvements in scapular, scapulothoracic and UE control with self care, reaching, carrying, lifting, house/yardwork, driving/computer work.    [] (88753) Provided verbal/tactile cueing for activities related to improving balance, coordination, kinesthetic sense, posture, motor skill, proprioception  to assist with  scapular, scapulothoracic and UE control with self care, reaching, carrying, lifting, house/yardwork, driving/computer work. Therapeutic Activities:    [] (41434 or 73284) Provided verbal/tactile cueing for activities related to improving balance, coordination, kinesthetic sense, posture, motor skill, proprioception and motor activation to allow for proper function of scapular, scapulothoracic and UE control with self care, carrying, lifting, driving/computer work. Home Exercise Program:    [x] (72836) Reviewed/Progressed HEP activities related to strengthening, flexibility, endurance, ROM of scapular, scapulothoracic and UE control with self care, reaching, carrying, lifting, house/yardwork, driving/computer work  [] (11210) Reviewed/Progressed HEP activities related to improving balance, coordination, kinesthetic sense, posture, motor skill, proprioception of scapular, scapulothoracic and UE control with self care, reaching, carrying, lifting, house/yardwork, driving/computer work      Manual Treatments:  PROM / STM / Oscillations-Mobs:  G-I, II, III, IV (PA's, Inf., Post.)  [x] (71065) Provided manual therapy to mobilize soft tissue/joints of cervical/CT, scapular GHJ and UE for the purpose of modulating pain, promoting relaxation,  increasing ROM, reducing/eliminating soft tissue swelling/inflammation/restriction, improving soft tissue extensibility and allowing for proper ROM for normal function with self care, reaching, carrying, lifting, house/yardwork, driving/computer work    Modalities:  Electrical Stimulation to L shoulder for pain control. Waveform: Premod;  Cycle Time: Continuous; Frequency: 80/150 Hz; Treatment time: 10 min with ice. Charges:  Timed Code Treatment Minutes: 35   Total Treatment Minutes: 45     [] EVAL (LOW) 33634 (typically 20 minutes face-to-face)  [x] ZO(40702) x  1   [] IONTO  [] NMR (78805) x      [] VASO  [x] Manual (73681) x  1    [] Other:  [] TA x       [] Mech Traction (52575)  [] ES(attended) (85172)      [x] ES (un) (89429):     GOALS:  Patient stated goal: use of LUE     Therapist goals for Patient:   Short Term Goals: To be achieved in: 2 weeks  1. Independent in HEP and progression per patient tolerance, in order to prevent re-injury. 2. Patient will have a decrease in pain to facilitate improvement in movement, function, and ADLs as indicated by Functional Deficits.     Long Term Goals: To be achieved in: 12 weeks  1. Disability index score of 40% or less for the DASH to assist with reaching prior level of function. 2. Patient will demonstrate increased AROM to WNL to allow for proper joint functioning as indicated by patients Functional Deficits. 3. Patient will demonstrate an increase in Strength to ?4+/5 to allow for proper functional mobility as indicated by patients Functional Deficits. 4. Patient will return to all functional activities without increased symptoms or restriction. 5. Pt will D/C sling and exhibit normal use of LUE with no ? pain (patient specific functional goal)                   Progression Towards Functional goals:  [x] Patient is progressing as expected towards functional goals listed. [] Progression is slowed due to complexities listed. [] Progression has been slowed due to co-morbidities. [] Plan just implemented, too soon to assess goals progression  [] Other:     ASSESSMENT:  Pt tolerated treatment well exhibiting good ROM. Pt will continue to benefit from ? ROM, gentle strengthening as tolerated and ? myofascial tightness throughout pedro scap musculature.      Treatment/Activity Tolerance:  [x] Patient tolerated treatment well [] Patient limited by fatique  [] Patient limited by pain  [] Patient limited by other medical complications  [] Other:     Prognosis: [x] Good [] Fair  [] Poor    Patient Requires Follow-up: [x] Yes  [] No    PLAN: See eval  [x] Continue per plan of care [] Alter current plan (see comments)  [] Plan of care initiated [] Hold pending MD visit [] Discharge    Electronically signed by: Raymonde Litten PT

## 2017-11-22 ENCOUNTER — HOSPITAL ENCOUNTER (OUTPATIENT)
Dept: PHYSICAL THERAPY | Age: 67
Discharge: HOME | End: 2017-11-22
Admitting: ORTHOPAEDIC SURGERY

## 2017-11-27 ENCOUNTER — OFFICE VISIT (OUTPATIENT)
Dept: ORTHOPEDIC SURGERY | Age: 67
End: 2017-11-27

## 2017-11-27 DIAGNOSIS — Z98.890 S/P ROTATOR CUFF REPAIR: Primary | ICD-10-CM

## 2017-11-27 PROCEDURE — 99024 POSTOP FOLLOW-UP VISIT: CPT | Performed by: ORTHOPAEDIC SURGERY

## 2017-11-27 NOTE — PROGRESS NOTES
HISTORY OF PRESENT ILLNESS: The patient returns today for left shoulder evaluation approximately 6 weeks after rotator cuff repair. Restrictions have been followed and the sling has been worn as requested     PHYSICAL EXAMINATION: Inspection of the affected shoulder reveals warm, dry, intact skin. The portal sites are healed. There is no adenopathy. The distal neurovascular exam is grossly intact. Range of motion reveals 120° of passive forward elevation and 20° of external rotation with the arm at his side. ASSESSMENT: Doing well 6 weeks after arthroscopic rotator cuff repair    PLAN:  The patient may discontinue the sling and progress to the next phase of physical therapy. Linnette Will will be scheduled for a reevaluation in 2 months.

## 2017-11-29 ENCOUNTER — HOSPITAL ENCOUNTER (OUTPATIENT)
Dept: PHYSICAL THERAPY | Age: 67
Discharge: HOME | End: 2017-11-29
Admitting: ORTHOPAEDIC SURGERY

## 2017-11-29 DIAGNOSIS — M75.122 COMPLETE TEAR OF LEFT ROTATOR CUFF: Primary | ICD-10-CM

## 2017-11-29 PROCEDURE — MISCPULLYB&C PULLY'S B&C: Performed by: ORTHOPAEDIC SURGERY

## 2017-11-29 NOTE — FLOWSHEET NOTE
[address] [place], [address]                Physical Therapy Daily Treatment Note  Date:  2017    Patient Name:  Zaire Veliz    :  1950  MRN: [id number]  Restrictions/Precautions:    Medical/Treatment Diagnosis Information:  · Diagnosis: Z98.890 L RTC repair, M25.512 L shoulder pain  · Treatment Diagnosis: s/p L RTC repair, SAD, biceps tenodesis   Insurance/Certification information:   Medicare  Physician Information:  Referring Practitioner: Dr. Kim Draft of care signed (Y/N): Y    Date of Patient follow up with Physician:     G-Code (if applicable):      Date G-Code Applied:  10/16/17       Progress Note: []  Yes  [x]  No  Next due by: Visit #10      Latex Allergy:  [x]NO      []YES  Preferred Language for Healthcare:   [x]English       []other:    Visit # Insurance Allowable   9 Med nec     Pain level:  1-2/10     SUBJECTIVE:  Pt reports his shoulder feels good. He has had some soreness with new exercises    Pt will be 8 wks post op on .      OBJECTIVE:   Observation:    Test measurements:     11/15: Flex: 150 ° , ER: 50 °     RESTRICTIONS/PRECAUTIONS: sling x6 wks, PROM only x6 wks    Exercises/Interventions:   Therapeutic Ex Sets/sec Reps Notes HEP   Naima's 5'         5\"  5\" 10  10  X  x   Wall slide 5\" 10  x    10\" 20  x    3 10  x   Scap squeeze 5\" 10  x       Bicep curl 3  5\"  3 10  10  10     1# X  X  x    10\"  10\" 10  10  x   Supine flex  Supine press  SA punch 3  3  3 10  10  10 2#  2#  2# X  x    2  2 10  10 GTB, see PTA note  RTB X  x                                             Pt edu:               Manual Intervention       PROM  10'      STM pec, scap mobs  4'  Pt states his shoulder feels great after mobs                                       NMR re-education                                                                   Therapeutic Exercise and NMR EXR  [x] (06161) Provided verbal/tactile cueing for activities related to strengthening, flexibility, endurance, ROM  for improvements in scapular, scapulothoracic and UE control with self care, reaching, carrying, lifting, house/yardwork, driving/computer work.    [] (46245) Provided verbal/tactile cueing for activities related to improving balance, coordination, kinesthetic sense, posture, motor skill, proprioception  to assist with  scapular, scapulothoracic and UE control with self care, reaching, carrying, lifting, house/yardwork, driving/computer work. Therapeutic Activities:    [] (10545 or 98105) Provided verbal/tactile cueing for activities related to improving balance, coordination, kinesthetic sense, posture, motor skill, proprioception and motor activation to allow for proper function of scapular, scapulothoracic and UE control with self care, carrying, lifting, driving/computer work.      Home Exercise Program:    [x] (26804) Reviewed/Progressed HEP activities related to strengthening, flexibility, endurance, ROM of scapular, scapulothoracic and UE control with self care, reaching, carrying, lifting, house/yardwork, driving/computer work  [] (70086) Reviewed/Progressed HEP activities related to improving balance, coordination, kinesthetic sense, posture, motor skill, proprioception of scapular, scapulothoracic and UE control with self care, reaching, carrying, lifting, house/yardwork, driving/computer work      Manual Treatments:  PROM / STM / Oscillations-Mobs:  G-I, II, III, IV (PA's, Inf., Post.)  [x] (76634) Provided manual therapy to mobilize soft tissue/joints of cervical/CT, scapular GHJ and UE for the purpose of modulating pain, promoting relaxation,  increasing ROM, reducing/eliminating soft tissue swelling/inflammation/restriction, improving soft tissue extensibility and allowing for proper ROM for normal function with self care, reaching, carrying, lifting, house/yardwork, driving/computer work    Modalities:  Electrical Stimulation to L shoulder for pain control. Waveform: Premod; Cycle Time: Continuous; Frequency: 80/150 Hz; Treatment time: 10 min with ice. Charges:  Timed Code Treatment Minutes: 42   Total Treatment Minutes: 52     [] EVAL (LOW) 24676 (typically 20 minutes face-to-face)  [x] NH(86862) x  2   [] IONTO  [] NMR (27877) x      [] VASO  [x] Manual (48859) x  1    [] Other:  [] TA x       [] Mech Traction (56693)  [] ES(attended) (26731)      [x] ES (un) (08196):     GOALS:  Patient stated goal: use of LUE     Therapist goals for Patient:   Short Term Goals: To be achieved in: 2 weeks  1. Independent in HEP and progression per patient tolerance, in order to prevent re-injury. 2. Patient will have a decrease in pain to facilitate improvement in movement, function, and ADLs as indicated by Functional Deficits.     Long Term Goals: To be achieved in: 12 weeks  1. Disability index score of 40% or less for the DASH to assist with reaching prior level of function. 2. Patient will demonstrate increased AROM to WNL to allow for proper joint functioning as indicated by patients Functional Deficits. 3. Patient will demonstrate an increase in Strength to ?4+/5 to allow for proper functional mobility as indicated by patients Functional Deficits. 4. Patient will return to all functional activities without increased symptoms or restriction. 5. Pt will D/C sling and exhibit normal use of LUE with no ? pain (patient specific functional goal)                   Progression Towards Functional goals:  [x] Patient is progressing as expected towards functional goals listed. [] Progression is slowed due to complexities listed. [] Progression has been slowed due to co-morbidities. [] Plan just implemented, too soon to assess goals progression  [] Other:     ASSESSMENT:  Pt exhibits good ROM and will continue to benefit from progressing strength and stability.       Treatment/Activity Tolerance:  [x] Patient tolerated treatment well [] Patient limited by fatique  [] Patient limited by pain  [] Patient limited by other medical complications  [] Other:     Prognosis: [x] Good [] Fair  [] Poor    Patient Requires Follow-up: [x] Yes  [] No    PLAN: See eval  [x] Continue per plan of care [] Alter current plan (see comments)  [] Plan of care initiated [] Hold pending MD visit [] Discharge    Electronically signed by: Ishan Lepe PT

## 2017-11-29 NOTE — FLOWSHEET NOTE
Physical Therapist Assistant Activity Sheet  Date:  2017    Patient Name:  Kayla Garcia    :  1950  MRN: [id number]  Restrictions/Precautions:    Medical/Treatment Diagnosis Information:  ·   Diagnosis: Z98.890 L RTC repair, M25.512 L shoulder pain  ·   Treatment Diagnosis: s/p L RTC repair, SAD, biceps tenodesis 10/11/17  Physician Information:    Referring Practitioner: Dr. Kedar Weems    -- s/p 6 weeks                   Activities                                                          DOS/DOI:                                                         Date: 17       Plyoback      Chop                         Chest                         ER Flip        Long/Short lever  Flex. Scap.                                 ABd.         punch        Body/Cardio Blade Flex/Ext                                    IR/ER                                    ABd/ADd        Push-up      Plus                           Wall                         Table                        Floor        No Money/HAB-HAD/Stance        Ball on Wall                Tramp        Theraband    Rows/Ext Green x 30 ea                         IR Red x 30                         ER Red x 30                         No money                          Horiz. ABd                          Biceps                          Triceps Green x 30                         Punches        Cable Column   Rows                               Ext. Lat. Pulldown                               Biceps                               Triceps        Modality    PTA Assessment Good recall of HEP, able to replicate with minimal cuing.    Time Based Treatment 10 min     Electronically signed by: Goyo Cavazos PTA

## 2017-12-01 ENCOUNTER — HOSPITAL ENCOUNTER (OUTPATIENT)
Dept: PHYSICAL THERAPY | Age: 67
Discharge: HOME | End: 2017-12-31
Attending: ORTHOPAEDIC SURGERY | Admitting: ORTHOPAEDIC SURGERY

## 2017-12-06 ENCOUNTER — HOSPITAL ENCOUNTER (OUTPATIENT)
Dept: PHYSICAL THERAPY | Age: 67
Discharge: HOME | End: 2017-12-06
Admitting: ORTHOPAEDIC SURGERY

## 2017-12-06 NOTE — FLOWSHEET NOTE
Physical Therapist Assistant Activity Sheet  Date:  2017    Patient Name:  Guerrero Avila    :  1950  MRN: [id number]  Restrictions/Precautions:    Medical/Treatment Diagnosis Information:  ·   Diagnosis: Z98.890 L RTC repair, M25.512 L shoulder pain  ·   Treatment Diagnosis: s/p L RTC repair, SAD, biceps tenodesis 10/11/17  Physician Information:    Referring Practitioner: Dr. Fredrick Caban    -- s/p 6 weeks                   Activities                                                          DOS/DOI:                                                         Date: 17        Plyoback      Chop                          Chest                          ER Flip          Long/Short lever  Flex. X 10                                Scap. X 10                                ABd.           punch          Body/Cardio Blade Flex/Ext                                     IR/ER                                     ABd/ADd          Push-up      Plus                            Wall                          Table                         Floor          No Money/HAB-HAD/Stance          Ball on Wall  4 way x 10 ea               Tramp          Theraband    Rows/Ext Green x 30 ea Blue x 30 ea                         IR Red x 30 Green x 30                         ER Red x 30 Green x 30                         No money                           Horiz. ABd                           Biceps                           Triceps Green x 30 Blue x 30                         Punches          Cable Column   Rows                                Ext. Lat. Pulldown                                Biceps                                Triceps          Modality  Premod + CP 10 min   PTA Assessment Good recall of HEP, able to replicate with minimal cuing. Progressed to blue TB with no difficulty, provided TB to supplement HEP. No complaints with new TE.     Time Based Treatment 10 min 20 min

## 2017-12-06 NOTE — PLAN OF CARE
[address], [facility]  [facility] [place], [address]               Physical Therapy Re-Certification Plan of Care    Dear  Dr. Graham Sorensen,    We had the pleasure of treating the following patient for physical therapy services at [address] [place] [address]. A summary of our findings can be found in the updated assessment below. This includes our plan of care. If you have any questions or concerns regarding these findings, please do not hesitate to contact me at the office phone number checked above.   Thank you for the referral.     Physician Signature:________________________________Date:__________________  By signing above (or electronic signature), therapists plan is approved by physician      Overall Response to Treatment:   [x]Patient is responding well to treatment and improvement is noted with regards  to goals   []Patient should continue to improve in reasonable time if they continue HEP   []Patient has plateaued and is no longer responding to skilled PT intervention    []Patient is getting worse and would benefit from return to referring MD   []Patient unable to adhere to initial POC   [x]Other: see above    Physical Therapy Daily Treatment Note  Date:  2017    Patient Name:  Barbara Carmen    :  1950  MRN: [id number]  Restrictions/Precautions:    Medical/Treatment Diagnosis Information:  · Diagnosis: Z98.890 L RTC repair, M25.512 L shoulder pain  · Treatment Diagnosis: s/p L RTC repair, SAD, biceps tenodesis /  Insurance/Certification information:   Medicare  Physician Information:  Referring Practitioner: Dr. Sukhdeep Doshi of care signed (Y/N): Y    Date of Patient follow up with Physician:     G-Code (if applicable):      Date G-Code Applied:  10/16/17  PT G-Codes  Functional Assessment Tool Used: Quick DASH  Score: 16%  Functional Limitation: Carrying, moving and handling objects  Carrying, carrying, lifting, house/yardwork, driving/computer work. Therapeutic Activities:    [] (88383 or 64256) Provided verbal/tactile cueing for activities related to improving balance, coordination, kinesthetic sense, posture, motor skill, proprioception and motor activation to allow for proper function of scapular, scapulothoracic and UE control with self care, carrying, lifting, driving/computer work. Home Exercise Program:    [x] (47821) Reviewed/Progressed HEP activities related to strengthening, flexibility, endurance, ROM of scapular, scapulothoracic and UE control with self care, reaching, carrying, lifting, house/yardwork, driving/computer work  [] (87488) Reviewed/Progressed HEP activities related to improving balance, coordination, kinesthetic sense, posture, motor skill, proprioception of scapular, scapulothoracic and UE control with self care, reaching, carrying, lifting, house/yardwork, driving/computer work      Manual Treatments:  PROM / STM / Oscillations-Mobs:  G-I, II, III, IV (PA's, Inf., Post.)  [x] (02061) Provided manual therapy to mobilize soft tissue/joints of cervical/CT, scapular GHJ and UE for the purpose of modulating pain, promoting relaxation,  increasing ROM, reducing/eliminating soft tissue swelling/inflammation/restriction, improving soft tissue extensibility and allowing for proper ROM for normal function with self care, reaching, carrying, lifting, house/yardwork, driving/computer work    Modalities:  Electrical Stimulation to L shoulder for pain control. Waveform: Premod; Cycle Time: Continuous; Frequency: 80/150 Hz; Treatment time: 10 min with ice.     Charges:  Timed Code Treatment Minutes: 42   Total Treatment Minutes: 52     [] EVAL (LOW) 81401 (typically 20 minutes face-to-face)  [x] TK(10972) x  2   [] IONTO  [] NMR (06275) x      [] VASO  [x] Manual (91168) x  1    [] Other:  [] TA x       [] Mech Traction (26770)  [] ES(attended) (25465)      [x] ES (un) (65824):

## 2017-12-13 ENCOUNTER — HOSPITAL ENCOUNTER (OUTPATIENT)
Dept: PHYSICAL THERAPY | Age: 67
Discharge: HOME | End: 2017-12-13
Admitting: ORTHOPAEDIC SURGERY

## 2017-12-13 NOTE — FLOWSHEET NOTE
[facility]  [address] [place], [address]               Physical Therapy Daily Treatment Note  Date:  2017    Patient Name:  Queenie Urrutia    :  1950  MRN: [id number]  Restrictions/Precautions:    Medical/Treatment Diagnosis Information:  · Diagnosis: Z98.890 L RTC repair, M25.512 L shoulder pain  · Treatment Diagnosis: s/p L RTC repair, SAD, biceps tenodesis /03  Insurance/Certification information:   Medicare  Physician Information:  Referring Practitioner: Dr. Rosalio Ramírez of care signed (Y/N): Y    Date of Patient follow up with Physician:     G-Code (if applicable):      Date G-Code Applied:  10/16/17       Progress Note: []  Yes  [x]  No  Next due by: Visit #10      Latex Allergy:  [x]NO      []YES  Preferred Language for Healthcare:   [x]English       []other:    Visit # Insurance Allowable   11 Med nec     Pain level:  1-2/10     SUBJECTIVE:  Pt reports he has some soreness as he is starting to lay on it at night. Pt will be 8 wks post op on .      OBJECTIVE:   Observation:    Test measurements:     : Flex: 180 ° , ER: 60 °,  °      RESTRICTIONS/PRECAUTIONS: sling x6 wks, PROM only x6 wks    Exercises/Interventions:   Therapeutic Ex Sets/sec Reps Notes HEP   Naima's 5'         5\"  5\" 10  10  X  x   Wall slide 5\" 10  x    10\" 20  x    3 10  x    5\" 10  x       Bicep curl 3  5\"  3 10  10  10     2# X  X  x    10\"  10\" 10  10  x    3  3  3 10  10  10 2#  2#  2# X  x    2  2 10  10 GTB  RTB X  x   See PTA note 20'                                         Pt edu:               Manual Intervention       PROM  10'      STM pec, scap mobs UT, pedro scap 4'  Pt states his shoulder feels great after mobs                                       NMR re-education                                                                   Therapeutic Exercise and NMR EXR  [x] (24229) Provided verbal/tactile Stimulation to L shoulder for pain control. Waveform: Premod; Cycle Time: Continuous; Frequency: 80/150 Hz; Treatment time: 10 min with ice. Charges:  Timed Code Treatment Minutes: 48   Total Treatment Minutes: 58     [] EVAL (LOW) 96109 (typically 20 minutes face-to-face)  [x] LC(80028) x  2   [] IONTO  [] NMR (84792) x      [] VASO  [x] Manual (86098) x  1    [] Other:  [] TA x       [] Mech Traction (63672)  [] ES(attended) (83905)      [x] ES (un) (59126):     GOALS:  Patient stated goal: use of LUE     Therapist goals for Patient:   Short Term Goals: To be achieved in: 2 weeks  1. Independent in HEP and progression per patient tolerance, in order to prevent re-injury. 2. Patient will have a decrease in pain to facilitate improvement in movement, function, and ADLs as indicated by Functional Deficits.     Long Term Goals: To be achieved in: 12 weeks  1. Disability index score of 40% or less for the DASH to assist with reaching prior level of function. 2. Patient will demonstrate increased AROM to WNL to allow for proper joint functioning as indicated by patients Functional Deficits. 3. Patient will demonstrate an increase in Strength to ?4+/5 to allow for proper functional mobility as indicated by patients Functional Deficits. 4. Patient will return to all functional activities without increased symptoms or restriction. 5. Pt will D/C sling and exhibit normal use of LUE with no ? pain (patient specific functional goal)                   Progression Towards Functional goals:  [x] Patient is progressing as expected towards functional goals listed. [] Progression is slowed due to complexities listed. [] Progression has been slowed due to co-morbidities. [] Plan just implemented, too soon to assess goals progression  [] Other:     ASSESSMENT:  Pt exhibits good ROM and will continue to benefit from progressing strength and stability.       Treatment/Activity Tolerance:  [x] Patient tolerated

## 2017-12-13 NOTE — FLOWSHEET NOTE
Physical Therapist Assistant Activity Sheet  Date:  2017    Patient Name:  Lolly Farrell    :  1950  MRN: [id number]  Restrictions/Precautions:    Medical/Treatment Diagnosis Information:  ·   Diagnosis: Z98.890 L RTC repair, M25.512 L shoulder pain  ·   Treatment Diagnosis: s/p L RTC repair, SAD, biceps tenodesis 10/11/17  Physician Information:    Referring Practitioner: Dr. Adrianne Patrick    -- s/p 6 weeks                   Activities                                                          DOS/DOI:                                                         Date: 17         Plyoback      Chop                           Chest                           ER Flip            Long/Short lever  Flex. X 10                                 Scap. X 10                                 ABd.             punch            Body/Cardio Blade Flex/Ext                                      IR/ER                                      ABd/ADd            Push-up      Plus                             Wall                           Table                          Floor            No Money/HAB-HAD/Stance            Ball on Wall  4 way x 10 ea 4 ways x 10 ea               Tramp            Theraband    Rows/Ext Green x 30 ea Blue x 30 ea Blue x 30 ea                         IR Red x 30 Green x 30 Green x 30                         ER Red x 30 Green x 30 Green x 30                         No money                            Horiz. ABd                            Biceps                            Triceps Green x 30 Blue x 30 Blue x 30                         Punches            Cable Column   Rows                                 Ext. Lat. Pulldown                                 Biceps                                 Triceps            Modality  Premod + CP 10 min    PTA Assessment Good recall of HEP, able to replicate with minimal cuing.  Progressed to blue TB with no difficulty, provided TB to supplement HEP. No complaints with new TE.   Continues to increase endurance, no complaints   Time Based Treatment 10 min 20 min 15 minutes     Electronically signed by: Henry Medina PTA

## 2017-12-20 ENCOUNTER — HOSPITAL ENCOUNTER (OUTPATIENT)
Dept: PHYSICAL THERAPY | Age: 67
Discharge: HOME | End: 2017-12-20
Admitting: ORTHOPAEDIC SURGERY

## 2017-12-20 NOTE — FLOWSHEET NOTE
[address] [place], [address]               Physical Therapy Daily Treatment Note  Date:  2017    Patient Name:  Fedreico Zarate    :  1950  MRN: [id number]  Restrictions/Precautions:    Medical/Treatment Diagnosis Information:  · Diagnosis: Z98.890 L RTC repair, M25.512 L shoulder pain  · Treatment Diagnosis: s/p L RTC repair, SAD, biceps tenodesis   Insurance/Certification information:   Medicare  Physician Information:  Referring Practitioner: Dr. Cornelio Huang of care signed (Y/N): Y    Date of Patient follow up with Physician:     G-Code (if applicable):      Date G-Code Applied:  10/16/17       Progress Note: []  Yes  [x]  No  Next due by: Visit #10      Latex Allergy:  [x]NO      []YES  Preferred Language for Healthcare:   [x]English       []other:    Visit # Insurance Allowable   12 Med nec     Pain level:  1-2/10     SUBJECTIVE:  Pt reports no difficulty with shoulder throughout daily activities. Pt will be 10 wks post op on .      OBJECTIVE:   Observation:    Test measurements:     : Flex: 180 ° , ER: 60 °,  °      RESTRICTIONS/PRECAUTIONS: sling x6 wks, PROM only x6 wks    Exercises/Interventions:   Therapeutic Ex Sets/sec Reps Notes HEP   Naima's 5'         5\"  5\" 10  10  X  x   Wall slide 5\" 10  x    10\" 20  x    3 10  x    5\" 10  x        3  5\"  3 10  10  10     NV, 2# X  X  x    10\"  10\" 10  10  x    3  3  3 10  10  10 2#  2#  2# X  x    2  2 10  10 GTB  RTB X  x   See PTA note 20'                                         Pt edu:               Manual Intervention       PROM  10'      STM pec, scap mobs UT, pedro scap 4'  Pt states his shoulder feels great after mobs                                       NMR re-education                                                                   Therapeutic Exercise and NMR EXR  [x] (17824) Provided verbal/tactile cueing for activities related to strengthening, flexibility, endurance, ROM  for improvements in scapular, scapulothoracic and UE control with self care, reaching, carrying, lifting, house/yardwork, driving/computer work.    [] (69457) Provided verbal/tactile cueing for activities related to improving balance, coordination, kinesthetic sense, posture, motor skill, proprioception  to assist with  scapular, scapulothoracic and UE control with self care, reaching, carrying, lifting, house/yardwork, driving/computer work. Therapeutic Activities:    [] (36939 or 81834) Provided verbal/tactile cueing for activities related to improving balance, coordination, kinesthetic sense, posture, motor skill, proprioception and motor activation to allow for proper function of scapular, scapulothoracic and UE control with self care, carrying, lifting, driving/computer work.      Home Exercise Program:    [x] (67782) Reviewed/Progressed HEP activities related to strengthening, flexibility, endurance, ROM of scapular, scapulothoracic and UE control with self care, reaching, carrying, lifting, house/yardwork, driving/computer work  [] (52582) Reviewed/Progressed HEP activities related to improving balance, coordination, kinesthetic sense, posture, motor skill, proprioception of scapular, scapulothoracic and UE control with self care, reaching, carrying, lifting, house/yardwork, driving/computer work      Manual Treatments:  PROM / STM / Oscillations-Mobs:  G-I, II, III, IV (PA's, Inf., Post.)  [x] (20138) Provided manual therapy to mobilize soft tissue/joints of cervical/CT, scapular GHJ and UE for the purpose of modulating pain, promoting relaxation,  increasing ROM, reducing/eliminating soft tissue swelling/inflammation/restriction, improving soft tissue extensibility and allowing for proper ROM for normal function with self care, reaching, carrying, lifting, house/yardwork, driving/computer work    Modalities:     Charges:  Timed Code Treatment Minutes: 45   Total Treatment Minutes: 45     [] EVAL (LOW) 96100 (typically 20 minutes face-to-face)  [x] AT(46296) x  2   [] IONTO  [] NMR (45421) x      [] VASO  [x] Manual (40281) x  1    [] Other:  [] TA x       [] Mech Traction (37200)  [] ES(attended) (82842)      [] ES (un) (85041):     GOALS:  Patient stated goal: use of LUE     Therapist goals for Patient:   Short Term Goals: To be achieved in: 2 weeks  1. Independent in HEP and progression per patient tolerance, in order to prevent re-injury. 2. Patient will have a decrease in pain to facilitate improvement in movement, function, and ADLs as indicated by Functional Deficits.     Long Term Goals: To be achieved in: 12 weeks  1. Disability index score of 40% or less for the DASH to assist with reaching prior level of function. 2. Patient will demonstrate increased AROM to WNL to allow for proper joint functioning as indicated by patients Functional Deficits. 3. Patient will demonstrate an increase in Strength to ?4+/5 to allow for proper functional mobility as indicated by patients Functional Deficits. 4. Patient will return to all functional activities without increased symptoms or restriction. 5. Pt will D/C sling and exhibit normal use of LUE with no ? pain (patient specific functional goal)                   Progression Towards Functional goals:  [x] Patient is progressing as expected towards functional goals listed. [] Progression is slowed due to complexities listed. [] Progression has been slowed due to co-morbidities. [] Plan just implemented, too soon to assess goals progression  [] Other:     ASSESSMENT:  Pt exhibits good ROM and will continue to benefit from progressing strength and stability.       Treatment/Activity Tolerance:  [x] Patient tolerated treatment well [] Patient limited by fatique  [] Patient limited by pain  [] Patient limited by other medical complications  [] Other:     Prognosis: [x] Good [] Fair  [] Poor    Patient Requires Follow-up: [x] Yes  [] No    PLAN: See eval  [x] Continue per plan of care [] Alter current plan (see comments)  [] Plan of care initiated [] Hold pending MD visit [] Discharge    Electronically signed by: Raymonde Litten PT

## 2017-12-20 NOTE — FLOWSHEET NOTE
Physical Therapist Assistant Activity Sheet  Date:  2017    Patient Name:  Eddy Tijerina    :  1950  MRN: [id number]  Restrictions/Precautions:    Medical/Treatment Diagnosis Information:  ·   Diagnosis: Z98.890 L RTC repair, M25.512 L shoulder pain  ·   Treatment Diagnosis: s/p L RTC repair, SAD, biceps tenodesis 10/11/17  Physician Information:    Referring Practitioner: Dr. Reji Tavares    -- s/p 6 weeks                   Activities                                                          DOS/DOI:                                                         Date: 17          Plyoback      Chop                            Chest                            ER Flip              Long/Short lever  Flex. X 10  X 20                                Scap. X 10  X 20                                ABd.               punch              Body/Cardio Blade Flex/Ext                                       IR/ER                                       ABd/ADd              Push-up      Plus                              Wall     X 20                       Table                           Floor              No Money/HAB-HAD/Stance              Ball on Wall  4 way x 10 ea 4 ways x 10 ea 4 ways x 20 ea               Tramp              Theraband    Rows/Ext Green x 30 ea Blue x 30 ea Blue x 30 ea BTB x 30 ea                         IR Red x 30 Green x 30 Green x 30 GTB x 30                         ER Red x 30 Green x 30 Green x 30 GTB x 30                         No money                             Horiz. ABd                             Biceps                             Triceps Green x 30 Blue x 30 Blue x 30 BTB x 30                         Punches              Cable Column   Rows                                  Ext.                                   Lat. Pulldown                                  Biceps                                  Triceps              Modality  Premod + CP 10 min PTA Assessment Good recall of HEP, able to replicate with minimal cuing. Progressed to blue TB with no difficulty, provided TB to supplement HEP. No complaints with new TE. Continues to increase endurance, no complaints Compensatory shoulder shrug.    Time Based Treatment 10 min 20 min 15 minutes 20 minutes     Electronically signed by: Renée Lyons PTA

## 2017-12-27 ENCOUNTER — HOSPITAL ENCOUNTER (OUTPATIENT)
Dept: PHYSICAL THERAPY | Age: 67
Discharge: HOME | End: 2017-12-27
Admitting: ORTHOPAEDIC SURGERY

## 2017-12-27 NOTE — FLOWSHEET NOTE
Therapeutic Exercise and NMR EXR  [x] (91670) Provided verbal/tactile cueing for activities related to strengthening, flexibility, endurance, ROM  for improvements in scapular, scapulothoracic and UE control with self care, reaching, carrying, lifting, house/yardwork, driving/computer work.    [] (81858) Provided verbal/tactile cueing for activities related to improving balance, coordination, kinesthetic sense, posture, motor skill, proprioception  to assist with  scapular, scapulothoracic and UE control with self care, reaching, carrying, lifting, house/yardwork, driving/computer work. Therapeutic Activities:    [] (85404 or 42834) Provided verbal/tactile cueing for activities related to improving balance, coordination, kinesthetic sense, posture, motor skill, proprioception and motor activation to allow for proper function of scapular, scapulothoracic and UE control with self care, carrying, lifting, driving/computer work.      Home Exercise Program:    [x] (79281) Reviewed/Progressed HEP activities related to strengthening, flexibility, endurance, ROM of scapular, scapulothoracic and UE control with self care, reaching, carrying, lifting, house/yardwork, driving/computer work  [] (91487) Reviewed/Progressed HEP activities related to improving balance, coordination, kinesthetic sense, posture, motor skill, proprioception of scapular, scapulothoracic and UE control with self care, reaching, carrying, lifting, house/yardwork, driving/computer work      Manual Treatments:  PROM / STM / Oscillations-Mobs:  G-I, II, III, IV (PA's, Inf., Post.)  [x] (78468) Provided manual therapy to mobilize soft tissue/joints of cervical/CT, scapular GHJ and UE for the purpose of modulating pain, promoting relaxation,  increasing ROM, reducing/eliminating soft tissue swelling/inflammation/restriction, improving soft tissue extensibility and allowing for proper ROM for normal function with Tolerance:  [x] Patient tolerated treatment well [] Patient limited by fatique  [] Patient limited by pain  [] Patient limited by other medical complications  [] Other:     Prognosis: [x] Good [] Fair  [] Poor    Patient Requires Follow-up: [x] Yes  [] No    PLAN: See eval  [x] Continue per plan of care [] Alter current plan (see comments)  [] Plan of care initiated [] Hold pending MD visit [] Discharge    Electronically signed by: Joshua Shukla PT

## 2017-12-27 NOTE — FLOWSHEET NOTE
Physical Therapist Assistant Activity Sheet  Date:  2017    Patient Name:  Huber Guerra    :  1950  MRN: [id number]  Restrictions/Precautions:    Medical/Treatment Diagnosis Information:  ·   Diagnosis: Z98.890 L RTC repair, M25.512 L shoulder pain  ·   Treatment Diagnosis: s/p L RTC repair, SAD, biceps tenodesis 10/11/17  Physician Information:    Referring Practitioner: Dr. Corrinne Pillar    -- s/p 6 weeks                   Activities                                                          DOS/DOI:                                                         Date: 17   Pulleys   5 min   Wall slide   10\" x 10         Plyoback      Chop                           Chest                           ER Flip            Long/Short lever  Flex. X 20                                 Scap. X 20                                 ABd.             punch            Body/Cardio Blade Flex/Ext                                      IR/ER                                      ABd/ADd            Push-up      Plus                             Wall   X 20                        Table                          Floor            No Money/HAB-HAD/Stance            Ball on Wall 4 ways x 10 ea 4 ways x 20 ea                Tramp            Theraband    Rows/Ext Blue x 30 ea BTB x 30 ea Black x 30 ea                         IR Green x 30 GTB x 30 Blue x 30                         ER Green x 30 GTB x 30 Green x 30                         No money                            Horiz. ABd                            Biceps                            Triceps Blue x 30 BTB x 30 Black x 30                         Punches            Cable Column   Rows                                 Ext.                                  Lat. Pulldown                                 Biceps                                 Triceps            Modality      PTA Assessment Continues to increase endurance, no complaints Compensatory shoulder nuno. Progressed TB, all other ex performed with PT   Time Based Treatment 15 minutes 20 minutes 15 minutes     Electronically signed by: Judd Abarca PTA

## 2018-01-01 ENCOUNTER — HOSPITAL ENCOUNTER (OUTPATIENT)
Dept: PHYSICAL THERAPY | Age: 68
Discharge: HOME | End: 2018-01-31
Attending: ORTHOPAEDIC SURGERY | Admitting: ORTHOPAEDIC SURGERY

## 2018-01-04 ENCOUNTER — HOSPITAL ENCOUNTER (OUTPATIENT)
Dept: PHYSICAL THERAPY | Age: 68
Discharge: HOME | End: 2018-01-04
Admitting: ORTHOPAEDIC SURGERY

## 2018-01-04 NOTE — FLOWSHEET NOTE
[address] [place], [address]               Physical Therapy Daily Treatment Note  Date:  2018    Patient Name:  Nayeli Bonilla    :  1950  MRN: [id number]  Restrictions/Precautions:    Medical/Treatment Diagnosis Information:  · Diagnosis: Z98.890 L RTC repair, M25.512 L shoulder pain  · Treatment Diagnosis: s/p L RTC repair, SAD, biceps tenodesis   Insurance/Certification information:   Medicare  Physician Information:  Referring Practitioner: Dr. Mary Thomson of care signed (Y/N): Y    Date of Patient follow up with Physician:     G-Code (if applicable):      Date G-Code Applied:  10/16/17       Progress Note: []  Yes  [x]  No  Next due by: Visit #10      Latex Allergy:  [x]NO      []YES  Preferred Language for Healthcare:   [x]English       []other:    Visit # Insurance Allowable   1   Med nec     Pain level:  -2/10     SUBJECTIVE:  Pt reports he thinks the cold weather is affecting his shoulder as he feels stiff today. Pt will be 12 wks post op on 1/3.      OBJECTIVE:   Observation:    Test measurements:     : Flex: 180 ° , ER: 60 °,  °      RESTRICTIONS/PRECAUTIONS: sling x6 wks, PROM only x6 wks    Exercises/Interventions:   Therapeutic Ex Sets/sec Reps Notes HEP   Naima's 5'         5\"  5\" 10  10       Wall slide 5\" 10  x    10\" 20      3 10      5\" 10          3  5\"  3 10  10  10     NV, GTB     x    10\"  10\" 10  10     Supine flex 3  3  3 10  10  10 6#  2#  2# X     Row/ext  ER/IR  Tricep 2  2 10  10 blackTB  BlackTB  BlackTB X  X  x    15'       1 20 NV x    2 10 Improved ROM today  x                         x5'             Manual Intervention       PROM  10'      STM pec, scap mobs UT, pedro scap 4'  Pt states his shoulder feels great after mobs                                       NMR re-education       Ball vs wall ??, ??, CW/CCW 1 30 Cues for 90 ° flex, full elbow normal function with self care, reaching, carrying, lifting, house/yardwork, driving/computer work    Modalities:     Charges:  Timed Code Treatment Minutes: 45   Total Treatment Minutes: 45     [] EVAL (LOW) 99579 (typically 20 minutes face-to-face)  [x] WJ(66361) x  2   [] IONTO  [] NMR (25420) x      [] VASO  [x] Manual (35422) x  1    [] Other:  [] TA x       [] Mech Traction (34994)  [] ES(attended) (95641)      [] ES (un) (07982):     GOALS:  Patient stated goal: use of LUE     Therapist goals for Patient:   Short Term Goals: To be achieved in: 2 weeks  1. Independent in HEP and progression per patient tolerance, in order to prevent re-injury. 2. Patient will have a decrease in pain to facilitate improvement in movement, function, and ADLs as indicated by Functional Deficits.     Long Term Goals: To be achieved in: 12 weeks  1. Disability index score of 40% or less for the DASH to assist with reaching prior level of function. 2. Patient will demonstrate increased AROM to WNL to allow for proper joint functioning as indicated by patients Functional Deficits. 3. Patient will demonstrate an increase in Strength to ?4+/5 to allow for proper functional mobility as indicated by patients Functional Deficits. 4. Patient will return to all functional activities without increased symptoms or restriction. 5. Pt will D/C sling and exhibit normal use of LUE with no ? pain (patient specific functional goal)                   Progression Towards Functional goals:  [x] Patient is progressing as expected towards functional goals listed. [] Progression is slowed due to complexities listed. [] Progression has been slowed due to co-morbidities. [] Plan just implemented, too soon to assess goals progression  [] Other:     ASSESSMENT:  Pt exhibits continued good ROM, nearly full ROM in all directions. However pt exhibits difficulty with active flexion against gravity. Pt will continue to benefit from ? strength. Treatment/Activity Tolerance:  [x] Patient tolerated treatment well [] Patient limited by fatique  [] Patient limited by pain  [] Patient limited by other medical complications  [] Other:     Prognosis: [x] Good [] Fair  [] Poor    Patient Requires Follow-up: [x] Yes  [] No    PLAN: See eval  [x] Continue per plan of care [] Alter current plan (see comments)  [] Plan of care initiated [] Hold pending MD visit [] Discharge    Electronically signed by: Diya Fuchs PT

## 2018-01-11 ENCOUNTER — HOSPITAL ENCOUNTER (OUTPATIENT)
Dept: PHYSICAL THERAPY | Age: 68
Discharge: HOME | End: 2018-01-11
Admitting: ORTHOPAEDIC SURGERY

## 2018-01-11 NOTE — FLOWSHEET NOTE
for 90 ° flex, full elbow ext                                                         Therapeutic Exercise and NMR EXR  [x] (71699) Provided verbal/tactile cueing for activities related to strengthening, flexibility, endurance, ROM  for improvements in scapular, scapulothoracic and UE control with self care, reaching, carrying, lifting, house/yardwork, driving/computer work.    [] (34567) Provided verbal/tactile cueing for activities related to improving balance, coordination, kinesthetic sense, posture, motor skill, proprioception  to assist with  scapular, scapulothoracic and UE control with self care, reaching, carrying, lifting, house/yardwork, driving/computer work. Therapeutic Activities:    [] (23346 or 91709) Provided verbal/tactile cueing for activities related to improving balance, coordination, kinesthetic sense, posture, motor skill, proprioception and motor activation to allow for proper function of scapular, scapulothoracic and UE control with self care, carrying, lifting, driving/computer work.      Home Exercise Program:    [x] (29893) Reviewed/Progressed HEP activities related to strengthening, flexibility, endurance, ROM of scapular, scapulothoracic and UE control with self care, reaching, carrying, lifting, house/yardwork, driving/computer work  [] (87626) Reviewed/Progressed HEP activities related to improving balance, coordination, kinesthetic sense, posture, motor skill, proprioception of scapular, scapulothoracic and UE control with self care, reaching, carrying, lifting, house/yardwork, driving/computer work      Manual Treatments:  PROM / STM / Oscillations-Mobs:  G-I, II, III, IV (PA's, Inf., Post.)  [x] (99509) Provided manual therapy to mobilize soft tissue/joints of cervical/CT, scapular GHJ and UE for the purpose of modulating pain, promoting relaxation,  increasing ROM, reducing/eliminating soft tissue swelling/inflammation/restriction, improving soft tissue extensibility and

## 2018-01-17 ENCOUNTER — HOSPITAL ENCOUNTER (OUTPATIENT)
Dept: PHYSICAL THERAPY | Age: 68
Discharge: HOME | End: 2018-01-17
Admitting: ORTHOPAEDIC SURGERY

## 2018-01-17 NOTE — FLOWSHEET NOTE
feels great after mobs                                       NMR re-education        1 30 Cues for 90 ° flex, full elbow ext                                                         Therapeutic Exercise and NMR EXR  [x] (70785) Provided verbal/tactile cueing for activities related to strengthening, flexibility, endurance, ROM  for improvements in scapular, scapulothoracic and UE control with self care, reaching, carrying, lifting, house/yardwork, driving/computer work.    [] (79834) Provided verbal/tactile cueing for activities related to improving balance, coordination, kinesthetic sense, posture, motor skill, proprioception  to assist with  scapular, scapulothoracic and UE control with self care, reaching, carrying, lifting, house/yardwork, driving/computer work. Therapeutic Activities:    [] (92616 or 11654) Provided verbal/tactile cueing for activities related to improving balance, coordination, kinesthetic sense, posture, motor skill, proprioception and motor activation to allow for proper function of scapular, scapulothoracic and UE control with self care, carrying, lifting, driving/computer work.      Home Exercise Program:    [x] (07395) Reviewed/Progressed HEP activities related to strengthening, flexibility, endurance, ROM of scapular, scapulothoracic and UE control with self care, reaching, carrying, lifting, house/yardwork, driving/computer work  [] (79883) Reviewed/Progressed HEP activities related to improving balance, coordination, kinesthetic sense, posture, motor skill, proprioception of scapular, scapulothoracic and UE control with self care, reaching, carrying, lifting, house/yardwork, driving/computer work      Manual Treatments:  PROM / STM / Oscillations-Mobs:  G-I, II, III, IV (PA's, Inf., Post.)  [x] (19535) Provided manual therapy to mobilize soft tissue/joints of cervical/CT, scapular GHJ and UE for the purpose of modulating pain, promoting relaxation,  increasing ROM, reducing/eliminating

## 2018-01-22 ENCOUNTER — HOSPITAL ENCOUNTER (OUTPATIENT)
Dept: PHYSICAL THERAPY | Age: 68
Discharge: HOME | End: 2018-01-22
Admitting: ORTHOPAEDIC SURGERY

## 2018-01-22 ENCOUNTER — OFFICE VISIT (OUTPATIENT)
Dept: ORTHOPEDIC SURGERY | Age: 68
End: 2018-01-22

## 2018-01-22 VITALS — WEIGHT: 190.04 LBS | BODY MASS INDEX: 29.83 KG/M2 | HEIGHT: 67 IN

## 2018-01-22 DIAGNOSIS — M75.122 COMPLETE TEAR OF LEFT ROTATOR CUFF: Primary | ICD-10-CM

## 2018-01-22 PROCEDURE — G8419 CALC BMI OUT NRM PARAM NOF/U: HCPCS | Performed by: PHYSICIAN ASSISTANT

## 2018-01-22 PROCEDURE — 4040F PNEUMOC VAC/ADMIN/RCVD: CPT | Performed by: PHYSICIAN ASSISTANT

## 2018-01-22 PROCEDURE — G8427 DOCREV CUR MEDS BY ELIG CLIN: HCPCS | Performed by: PHYSICIAN ASSISTANT

## 2018-01-22 PROCEDURE — G8484 FLU IMMUNIZE NO ADMIN: HCPCS | Performed by: PHYSICIAN ASSISTANT

## 2018-01-22 PROCEDURE — 3017F COLORECTAL CA SCREEN DOC REV: CPT | Performed by: PHYSICIAN ASSISTANT

## 2018-01-22 PROCEDURE — 1123F ACP DISCUSS/DSCN MKR DOCD: CPT | Performed by: PHYSICIAN ASSISTANT

## 2018-01-22 PROCEDURE — 1036F TOBACCO NON-USER: CPT | Performed by: PHYSICIAN ASSISTANT

## 2018-01-22 PROCEDURE — 99213 OFFICE O/P EST LOW 20 MIN: CPT | Performed by: PHYSICIAN ASSISTANT

## 2018-01-22 PROCEDURE — G8598 ASA/ANTIPLAT THER USED: HCPCS | Performed by: PHYSICIAN ASSISTANT

## 2018-01-22 NOTE — FLOWSHEET NOTE
10'       1 20  x    2 10 Improved ROM today  x   Supine alphabet  3 6#    sidelying ABD 2 10 3#     2 10      x5'             Manual Intervention       PROM  12'      STM pec, scap mobs UT, pedro scap 4'  Pt states his shoulder feels great after mobs                                       NMR re-education        1 30 Cues for 90 ° flex, full elbow ext                                                         Therapeutic Exercise and NMR EXR  [x] (40102) Provided verbal/tactile cueing for activities related to strengthening, flexibility, endurance, ROM  for improvements in scapular, scapulothoracic and UE control with self care, reaching, carrying, lifting, house/yardwork, driving/computer work.    [] (70711) Provided verbal/tactile cueing for activities related to improving balance, coordination, kinesthetic sense, posture, motor skill, proprioception  to assist with  scapular, scapulothoracic and UE control with self care, reaching, carrying, lifting, house/yardwork, driving/computer work. Therapeutic Activities:    [] (66383 or 67500) Provided verbal/tactile cueing for activities related to improving balance, coordination, kinesthetic sense, posture, motor skill, proprioception and motor activation to allow for proper function of scapular, scapulothoracic and UE control with self care, carrying, lifting, driving/computer work.      Home Exercise Program:    [x] (69937) Reviewed/Progressed HEP activities related to strengthening, flexibility, endurance, ROM of scapular, scapulothoracic and UE control with self care, reaching, carrying, lifting, house/yardwork, driving/computer work  [] (77064) Reviewed/Progressed HEP activities related to improving balance, coordination, kinesthetic sense, posture, motor skill, proprioception of scapular, scapulothoracic and UE control with self care, reaching, carrying, lifting, house/yardwork, driving/computer work      Manual Treatments:  PROM / STM / Oscillations-Mobs:  G-I, II, complexities listed. [] Progression has been slowed due to co-morbidities. [] Plan just implemented, too soon to assess goals progression  [] Other:     ASSESSMENT:  Pt exhibits continued good ROM, nearly full ROM in all directions. However pt exhibits difficulty with active flexion against gravity. Pt will continue to benefit from ? strength with biofeedback to ? shoulder hiking.      Treatment/Activity Tolerance:  [x] Patient tolerated treatment well [] Patient limited by fatique  [] Patient limited by pain  [] Patient limited by other medical complications  [] Other:     Prognosis: [x] Good [] Fair  [] Poor    Patient Requires Follow-up: [x] Yes  [] No    PLAN: See eval  [x] Continue per plan of care [] Alter current plan (see comments)  [] Plan of care initiated [] Hold pending MD visit [] Discharge    Electronically signed by: Melony Arteaga PT

## 2018-01-22 NOTE — FLOWSHEET NOTE
Physical Therapist Assistant Activity Sheet  Date:  2018    Patient Name:  Aleks Herron    :  1950  MRN: [id number]  Restrictions/Precautions:    Medical/Treatment Diagnosis Information:  ·   Diagnosis: Z98.890 L RTC repair, M25.512 L shoulder pain  ·   Treatment Diagnosis: s/p L RTC repair, SAD, biceps tenodesis 10/11/17  Physician Information:    Referring Practitioner: Dr. Louis Mir 373    -- s/p 6 weeks                   Activities                                                          DOS/DOI:                                                         Date: 17   Pulleys 5 min      Wall slide 10\" x 10  10\" x 10           [place]      Chop                            Chest                            ER Flip              Long/Short lever  Flex. With PT                                 Scap. With PT                                 ABd.               punch              Body/Cardio Blade Flex/Ext                                       IR/ER                                       ABd/ADd              Push-up      Plus                              Wall    With PT                        Table                           Floor              No Money/HAB-HAD/Stance              Ball on Wall  4 ways 2 x 10 4 ways 2 x 10 4 ways 2 x 10               Tramp              Theraband    Rows/Ext Black x 30 ea Black x 30 ea Black x 30 ea Grey x 30 ea                         IR Blue x 30 Black x 30 Black x 30 Grey x 30                         ER Green x 30 Black x 30 Black x 30 Grey x 30                         No money                             Horiz. ABd                             Biceps   5# x 30                          Triceps Black x 30 Black x 30 Black x 30 Grey x 30                         Punches              Cable Column   Rows                                  Ext.                                   Lat. Pulldown                                  Biceps Triceps              Modality       PTA Assessment Progressed TB, all other ex performed with PT Progressed with TB, good ER control Increasing in strength, no complaints today. Added bicep curls  Progressed to grey TB and provided to supplement HEP. ER compensation present. Did not perform all ex d/t time constraints.     Time Based Treatment 15 minutes 15 minutes 15 minutes 10 minutes     Electronically signed by: Jana Olmedo PTA

## 2018-02-01 ENCOUNTER — OFFICE VISIT (OUTPATIENT)
Dept: ORTHOPEDIC SURGERY | Age: 68
End: 2018-02-01

## 2018-02-01 ENCOUNTER — HOSPITAL ENCOUNTER (OUTPATIENT)
Dept: PHYSICAL THERAPY | Age: 68
Discharge: HOME | End: 2018-02-28
Attending: ORTHOPAEDIC SURGERY | Admitting: ORTHOPAEDIC SURGERY

## 2018-02-01 DIAGNOSIS — G56.22 ULNAR NEUROPATHY OF LEFT UPPER EXTREMITY: ICD-10-CM

## 2018-02-01 DIAGNOSIS — G56.02 CARPAL TUNNEL SYNDROME OF LEFT WRIST: Primary | ICD-10-CM

## 2018-02-01 PROCEDURE — 95908 NRV CNDJ TST 3-4 STUDIES: CPT | Performed by: PHYSICAL MEDICINE & REHABILITATION

## 2018-02-01 PROCEDURE — 95886 MUSC TEST DONE W/N TEST COMP: CPT | Performed by: PHYSICAL MEDICINE & REHABILITATION

## 2018-02-05 ENCOUNTER — OFFICE VISIT (OUTPATIENT)
Dept: ORTHOPEDIC SURGERY | Age: 68
End: 2018-02-05

## 2018-02-05 VITALS — HEIGHT: 67 IN | BODY MASS INDEX: 29.83 KG/M2 | WEIGHT: 190.04 LBS

## 2018-02-05 DIAGNOSIS — M25.512 LEFT SHOULDER PAIN, UNSPECIFIED CHRONICITY: Primary | ICD-10-CM

## 2018-02-05 PROCEDURE — G8598 ASA/ANTIPLAT THER USED: HCPCS | Performed by: PHYSICIAN ASSISTANT

## 2018-02-05 PROCEDURE — G8427 DOCREV CUR MEDS BY ELIG CLIN: HCPCS | Performed by: PHYSICIAN ASSISTANT

## 2018-02-05 PROCEDURE — 99213 OFFICE O/P EST LOW 20 MIN: CPT | Performed by: PHYSICIAN ASSISTANT

## 2018-02-05 PROCEDURE — G8484 FLU IMMUNIZE NO ADMIN: HCPCS | Performed by: PHYSICIAN ASSISTANT

## 2018-02-05 PROCEDURE — G8419 CALC BMI OUT NRM PARAM NOF/U: HCPCS | Performed by: PHYSICIAN ASSISTANT

## 2018-02-05 PROCEDURE — 1123F ACP DISCUSS/DSCN MKR DOCD: CPT | Performed by: PHYSICIAN ASSISTANT

## 2018-02-05 PROCEDURE — 4040F PNEUMOC VAC/ADMIN/RCVD: CPT | Performed by: PHYSICIAN ASSISTANT

## 2018-02-05 PROCEDURE — 3017F COLORECTAL CA SCREEN DOC REV: CPT | Performed by: PHYSICIAN ASSISTANT

## 2018-02-05 PROCEDURE — 1036F TOBACCO NON-USER: CPT | Performed by: PHYSICIAN ASSISTANT

## 2018-02-12 ENCOUNTER — TELEPHONE (OUTPATIENT)
Dept: ORTHOPEDIC SURGERY | Age: 68
End: 2018-02-12

## 2018-02-14 ENCOUNTER — HOSPITAL ENCOUNTER (OUTPATIENT)
Dept: MRI IMAGING | Age: 68
Discharge: HOME | End: 2018-02-14
Attending: ORTHOPAEDIC SURGERY | Admitting: ORTHOPAEDIC SURGERY

## 2018-02-14 DIAGNOSIS — M25.512 LEFT SHOULDER PAIN, UNSPECIFIED CHRONICITY: ICD-10-CM

## 2018-02-14 DIAGNOSIS — M25.512 PAIN IN LEFT SHOULDER: ICD-10-CM

## 2018-02-19 ENCOUNTER — OFFICE VISIT (OUTPATIENT)
Dept: ORTHOPEDIC SURGERY | Age: 68
End: 2018-02-19

## 2018-02-19 VITALS — WEIGHT: 190.04 LBS | HEIGHT: 67 IN | BODY MASS INDEX: 29.83 KG/M2

## 2018-02-19 DIAGNOSIS — M75.122 COMPLETE TEAR OF LEFT ROTATOR CUFF: Primary | ICD-10-CM

## 2018-02-19 DIAGNOSIS — M19.012 GLENOHUMERAL ARTHRITIS, LEFT: ICD-10-CM

## 2018-02-19 PROCEDURE — G8427 DOCREV CUR MEDS BY ELIG CLIN: HCPCS | Performed by: PHYSICIAN ASSISTANT

## 2018-02-19 PROCEDURE — 1036F TOBACCO NON-USER: CPT | Performed by: PHYSICIAN ASSISTANT

## 2018-02-19 PROCEDURE — G8484 FLU IMMUNIZE NO ADMIN: HCPCS | Performed by: PHYSICIAN ASSISTANT

## 2018-02-19 PROCEDURE — 3017F COLORECTAL CA SCREEN DOC REV: CPT | Performed by: PHYSICIAN ASSISTANT

## 2018-02-19 PROCEDURE — 99213 OFFICE O/P EST LOW 20 MIN: CPT | Performed by: PHYSICIAN ASSISTANT

## 2018-02-19 PROCEDURE — G8598 ASA/ANTIPLAT THER USED: HCPCS | Performed by: PHYSICIAN ASSISTANT

## 2018-02-19 PROCEDURE — 4040F PNEUMOC VAC/ADMIN/RCVD: CPT | Performed by: PHYSICIAN ASSISTANT

## 2018-02-19 PROCEDURE — G8419 CALC BMI OUT NRM PARAM NOF/U: HCPCS | Performed by: PHYSICIAN ASSISTANT

## 2018-02-19 PROCEDURE — 1123F ACP DISCUSS/DSCN MKR DOCD: CPT | Performed by: PHYSICIAN ASSISTANT

## 2018-02-19 NOTE — PROGRESS NOTES
tenderness. Spurling's sign is negative and did not produce shoulder pain. The distal neurovascular exam is grossly intact. MRI: MRI of the left shoulder reveals:  Images obtained February 14, 2018. I personally reviewed the images and discussed the results with Dr. Shelia Ruvalcaba MD.  1. Large (3.8 x 3.5 cm) full thickness retear of the supraspinatus muscle. Increased myotendinous retraction since the prior exam.  Mildly increased   atrophy. 2. A large full-thickness tear involving the cranial portion of the   subscapularis tendon is re-identified. 3. Interval long head biceps tenodesis. 4. Mild to moderate glenohumeral degenerative change.  Small joint effusion   and mild synovitis, similar to the prior exam.   5. Postsurgical change at the [facility] joint.  Small amount of   subacromial-subdeltoid bursal fluid. ASSESSMENT:    1. Recurrent rotator cuff tear  2. Glenohumeral osteoarthritis    PLAN:  I had a detailed discussion with Santa Barron. At this point time, he would like to consider his options. If she were to choose surgery, I would recommend a reverse total shoulder arthroplasty and biceps tenodesis. Surgery would be scheduled at a mutually convenient time. He is in agreement with this plan. I discussed the risks of surgery which include but are not limited to infection, bleeding, nerve injury resulting in motor or sensory loss, DVT, RSD, persistent pain, loss of motion, functional instability, and need for further surgery. At no time were any guarantees implied or stated. The patient acknowledged these risks and electively reviewed and signed the consent form.

## 2018-11-13 ENCOUNTER — TELEPHONE (OUTPATIENT)
Dept: ORTHOPEDIC SURGERY | Age: 68
End: 2018-11-13

## 2018-11-13 NOTE — TELEPHONE ENCOUNTER
RETURNED CALL TO PATIENT TO [address] [place] [address], [place] PER DR. Payton Zazueta. KEPT ASKING FOR A MAILBOX #, COULD NOT LEAVE A MESSAGE.

## 2020-01-04 ENCOUNTER — APPOINTMENT (OUTPATIENT)
Dept: GENERAL RADIOLOGY | Age: 70
End: 2020-01-04
Payer: MEDICARE

## 2020-01-04 ENCOUNTER — HOSPITAL ENCOUNTER (EMERGENCY)
Age: 70
Discharge: HOME | End: 2020-01-04
Attending: EMERGENCY MEDICINE
Payer: MEDICARE

## 2020-01-04 VITALS
HEART RATE: 75 BPM | HEIGHT: 67 IN | TEMPERATURE: 98.1 F | SYSTOLIC BLOOD PRESSURE: 141 MMHG | BODY MASS INDEX: 29.82 KG/M2 | OXYGEN SATURATION: 97 % | RESPIRATION RATE: 17 BRPM | WEIGHT: 190 LBS | DIASTOLIC BLOOD PRESSURE: 81 MMHG

## 2020-01-04 PROCEDURE — 73560 X-RAY EXAM OF KNEE 1 OR 2: CPT

## 2020-01-04 PROCEDURE — 4500000024 HC ED LEVEL 4 PROCEDURE

## 2020-01-04 PROCEDURE — 96375 TX/PRO/DX INJ NEW DRUG ADDON: CPT

## 2020-01-04 PROCEDURE — 2500000003 HC RX 250 WO HCPCS: Performed by: EMERGENCY MEDICINE

## 2020-01-04 PROCEDURE — 99152 MOD SED SAME PHYS/QHP 5/>YRS: CPT

## 2020-01-04 PROCEDURE — 96374 THER/PROPH/DIAG INJ IV PUSH: CPT

## 2020-01-04 PROCEDURE — 73600 X-RAY EXAM OF ANKLE: CPT

## 2020-01-04 PROCEDURE — 99284 EMERGENCY DEPT VISIT MOD MDM: CPT

## 2020-01-04 PROCEDURE — 73620 X-RAY EXAM OF FOOT: CPT

## 2020-01-04 PROCEDURE — 6360000002 HC RX W HCPCS: Performed by: EMERGENCY MEDICINE

## 2020-01-04 RX ORDER — KETAMINE HYDROCHLORIDE 50 MG/ML
80 INJECTION, SOLUTION, CONCENTRATE INTRAMUSCULAR; INTRAVENOUS ONCE
Status: COMPLETED | OUTPATIENT
Start: 2020-01-04 | End: 2020-01-04

## 2020-01-04 RX ORDER — ONDANSETRON 4 MG/1
4 TABLET, ORALLY DISINTEGRATING ORAL EVERY 8 HOURS PRN
Qty: 10 TABLET | Refills: 0 | Status: SHIPPED | OUTPATIENT
Start: 2020-01-04 | End: 2022-07-06 | Stop reason: ALTCHOICE

## 2020-01-04 RX ORDER — HYDROCODONE BITARTRATE AND ACETAMINOPHEN 5; 325 MG/1; MG/1
1 TABLET ORAL EVERY 6 HOURS PRN
Qty: 9 TABLET | Refills: 0 | Status: SHIPPED | OUTPATIENT
Start: 2020-01-04 | End: 2020-01-07

## 2020-01-04 RX ORDER — MORPHINE SULFATE 4 MG/ML
4 INJECTION, SOLUTION INTRAMUSCULAR; INTRAVENOUS ONCE
Status: COMPLETED | OUTPATIENT
Start: 2020-01-04 | End: 2020-01-04

## 2020-01-04 RX ADMIN — KETAMINE HYDROCHLORIDE 60 MG: 50 INJECTION, SOLUTION INTRAMUSCULAR; INTRAVENOUS at 16:41

## 2020-01-04 RX ADMIN — MORPHINE SULFATE 4 MG: 4 INJECTION, SOLUTION INTRAMUSCULAR; INTRAVENOUS at 15:55

## 2020-01-04 ASSESSMENT — PAIN DESCRIPTION - PAIN TYPE: TYPE: ACUTE PAIN

## 2020-01-04 ASSESSMENT — PAIN SCALES - GENERAL
PAINLEVEL_OUTOF10: 5
PAINLEVEL_OUTOF10: 7
PAINLEVEL_OUTOF10: 4

## 2020-01-04 ASSESSMENT — PAIN DESCRIPTION - LOCATION: LOCATION: ANKLE;FOOT

## 2020-01-04 ASSESSMENT — PAIN DESCRIPTION - ORIENTATION: ORIENTATION: LEFT

## 2020-01-04 NOTE — ED NOTES
Son back at bedside at this time. Pt alert and oriented. Will continue to monitor.       Windy Mcnulty, MAGY  01/04/20 4770

## 2020-01-05 NOTE — ED NOTES
Saline lock removed intact. IV site looked unremarkable. Pressure dressing applied. Discharge instructions reviewed with Mr. Rincon. He verbalized understanding. Copy of discharge instructions and prescriptions given. He was advised not to drive, drink ETOH, operate machinery, or supervise small children after receiving pain medications here in the ED or while taking pain medications at home. He verbalized understanding. Mr. Michelle Wang was discharged to home in good condition per personal vehicle, family driving. He exited the ED without difficulty.       Long Fajardo RN  01/04/20 1923

## 2020-01-05 NOTE — ED PROVIDER NOTES
History    Marital status:      Spouse name: Not on file    Number of children: Not on file    Years of education: Not on file    Highest education level: Not on file   Occupational History    Not on file   Social Needs    Financial resource strain: Not on file    Food insecurity:     Worry: Not on file     Inability: Not on file    Transportation needs:     Medical: Not on file     Non-medical: Not on file   Tobacco Use    Smoking status: Never Smoker    Smokeless tobacco: Never Used   Substance and Sexual Activity    Alcohol use: Yes     Comment: 2 drinks 6X per year    Drug use: No    Sexual activity: Not on file   Lifestyle    Physical activity:     Days per week: Not on file     Minutes per session: Not on file    Stress: Not on file   Relationships    Social connections:     Talks on phone: Not on file     Gets together: Not on file     Attends Worship service: Not on file     Active member of club or organization: Not on file     Attends meetings of clubs or organizations: Not on file     Relationship status: Not on file    Intimate partner violence:     Fear of current or ex partner: Not on file     Emotionally abused: Not on file     Physically abused: Not on file     Forced sexual activity: Not on file   Other Topics Concern    Not on file   Social History Narrative    Not on file     No current facility-administered medications for this encounter. Current Outpatient Medications   Medication Sig Dispense Refill    HYDROcodone-acetaminophen (NORCO) 5-325 MG per tablet Take 1 tablet by mouth every 6 hours as needed for Pain for up to 3 days. Intended supply: 3 days.  Take lowest dose possible to manage pain 9 tablet 0    ondansetron (ZOFRAN ODT) 4 MG disintegrating tablet Take 1 tablet by mouth every 8 hours as needed for Nausea or Vomiting 10 tablet 0    NONFORMULARY by Nasal route as needed Nasal spary for allergies      loratadine (CLARITIN) 10 MG tablet Take 1 tablet by mouth daily (Patient taking differently: Take 10 mg by mouth daily as needed ) 30 tablet 0    pantoprazole (PROTONIX) 40 MG tablet Take 40 mg by mouth daily.  clonazePAM (KLONOPIN) 1 MG tablet Take 1 mg by mouth nightly.  Cholecalciferol (VITAMIN D3) 2000 UNITS CAPS Take  by mouth daily.  aspirin 81 MG tablet Take 81 mg by mouth daily.  rosuvastatin (CRESTOR) 10 MG tablet Take 10 mg by mouth daily Twice  every other day      sertraline (ZOLOFT) 50 MG tablet Take 50 mg by mouth daily. Allergies   Allergen Reactions    Codeine Hives    Pcn [Penicillins] Hives    Statins      One statin caused myalgia       REVIEW OF SYSTEMS  10 systems reviewed, pertinent positives per HPI otherwise noted to be negative. PHYSICAL EXAM  BP (!) 141/81   Pulse 75   Temp 98.1 °F (36.7 °C)   Resp 17   Ht 5' 7\" (1.702 m)   Wt 190 lb (86.2 kg)   SpO2 97%   BMI 29.76 kg/m²    Physical exam:  General appearance: awake and cooperative. No distress. Non toxic appearing. HENT: Normocephalic. Atraumatic. Mucus membranes are moist.   Neck: supple  Eyes: BOB. EOM intact. Heart: RRR. No murmurs. Lungs: Respirations unlabored. CTAB. No wheezes, rales, or rhonchi. Good air exchange  Abdomen: No tenderness. Soft. Non distended. No masses. No organomegaly. No peritoneal signs. Musculoskeletal: LLE: No hip tenderness to palpation or decrease in his motion. Knee without tenderness or effusion;  Able to flex and extend. Left ankle with tenderness, soft tissue swelling, and obvious deformity. No tenderness to the distal foot. Cap refill less than 2 seconds. PT and DP +2/4 bilaterally. Sensation intact all toes and dermatomes of foot. Flexion and extension 5-5 strength in great toe. Right lower extremity atraumatic. Compartments soft. No deformity. No tenderness in the extremities. All extremities neurovascularly intact. Radial, Dp, and PT pulses +2/4 bilaterally  Neurological: Alert and oriented. Mckenzie woke up without difficulty, and was sent home with someone to evaluate them during the post-sedation period. Total amount of inservice time: 13 minutes    Procedure Note - Joint Reduction: The benefits, risks, and alternatives of ankle reduction were discussed with the patient. Questions were sought and answered. Written consent was given for the procedure. Prior to joint reduction a time out with nursing was called. Caitlin Bishop was given ketamine and procedural pain control was adequately achieved. The dislocation and/or fracture was reduced to the best of my abilities utilizing toe traction. Following reduction, immobilization was performed and the extremity's neurovascular status was re-checked and was unchanged from the pre-procedure exam. The patient tolerated the procedure without complications. Instructions were given to return immediately for increasing pain, new numbness or weakness, a cold/pale extremity or any other worsening or worrisome concerns. Procedure Note - Splint Application:  Orthopedic splint (posterior and sugartong on the left ankle) was applied by myself using orthoglass. Caitlin Bishop tolerated the procedure well with no acute complications. Makenzie Rincon's distal neurovascular exam remains unchanged status post splint application. During the patient's ED course, the patient was given:  Medications   morphine sulfate (PF) injection 4 mg (4 mg Intravenous Given 1/4/20 1555)   ketamine (KETALAR) injection 80 mg (60 mg Intravenous Given by Other 1/4/20 1641)        CLINICAL IMPRESSION  1. Fracture dislocation of left ankle joint, closed, initial encounter        Blood pressure (!) 141/81, pulse 75, temperature 98.1 °F (36.7 °C), resp. rate 17, height 5' 7\" (1.702 m), weight 190 lb (86.2 kg), SpO2 97 %. Patient was given scripts for the following medications. I counseled patient how to take these medications.    Discharge Medication List as of 1/4/2020  7:08 PM

## 2021-03-29 ENCOUNTER — IMMUNIZATION (OUTPATIENT)
Dept: PRIMARY CARE CLINIC | Age: 71
End: 2021-03-29
Payer: MEDICARE

## 2021-03-29 PROCEDURE — 0001A COVID-19, PFIZER VACCINE 30MCG/0.3ML DOSE: CPT | Performed by: FAMILY MEDICINE

## 2021-03-29 PROCEDURE — 91300 COVID-19, PFIZER VACCINE 30MCG/0.3ML DOSE: CPT | Performed by: FAMILY MEDICINE

## 2021-04-19 ENCOUNTER — IMMUNIZATION (OUTPATIENT)
Dept: PRIMARY CARE CLINIC | Age: 71
End: 2021-04-19
Payer: MEDICARE

## 2021-04-19 PROCEDURE — 91300 COVID-19, PFIZER VACCINE 30MCG/0.3ML DOSE: CPT | Performed by: FAMILY MEDICINE

## 2021-04-19 PROCEDURE — 0002A COVID-19, PFIZER VACCINE 30MCG/0.3ML DOSE: CPT | Performed by: FAMILY MEDICINE

## 2021-06-07 ENCOUNTER — APPOINTMENT (OUTPATIENT)
Dept: CT IMAGING | Age: 71
End: 2021-06-07
Payer: MEDICARE

## 2021-06-07 ENCOUNTER — HOSPITAL ENCOUNTER (EMERGENCY)
Age: 71
Discharge: HOME | End: 2021-06-07
Payer: MEDICARE

## 2021-06-07 VITALS
BODY MASS INDEX: 30.23 KG/M2 | SYSTOLIC BLOOD PRESSURE: 136 MMHG | WEIGHT: 193 LBS | RESPIRATION RATE: 18 BRPM | OXYGEN SATURATION: 96 % | TEMPERATURE: 97.9 F | DIASTOLIC BLOOD PRESSURE: 77 MMHG | HEART RATE: 65 BPM

## 2021-06-07 DIAGNOSIS — R10.9 FLANK PAIN: Primary | ICD-10-CM

## 2021-06-07 LAB
A/G RATIO: 1.4 (ref 1.1–2.2)
ALBUMIN SERPL-MCNC: 4.3 G/DL (ref 3.4–5)
ALP BLD-CCNC: 89 U/L (ref 40–129)
ALT SERPL-CCNC: 15 U/L (ref 10–40)
ANION GAP SERPL CALCULATED.3IONS-SCNC: 8 MMOL/L (ref 3–16)
AST SERPL-CCNC: 21 U/L (ref 15–37)
BASOPHILS ABSOLUTE: 0.1 K/UL (ref 0–0.2)
BASOPHILS RELATIVE PERCENT: 1 %
BILIRUB SERPL-MCNC: 0.5 MG/DL (ref 0–1)
BILIRUBIN URINE: NEGATIVE
BLOOD, URINE: NEGATIVE
BUN BLDV-MCNC: 17 MG/DL (ref 7–20)
CALCIUM SERPL-MCNC: 9.3 MG/DL (ref 8.3–10.6)
CHLORIDE BLD-SCNC: 104 MMOL/L (ref 99–110)
CLARITY: CLEAR
CO2: 25 MMOL/L (ref 21–32)
COLOR: YELLOW
CREAT SERPL-MCNC: 0.7 MG/DL (ref 0.8–1.3)
EKG ATRIAL RATE: 65 BPM
EKG DIAGNOSIS: NORMAL
EKG P AXIS: 49 DEGREES
EKG P-R INTERVAL: 190 MS
EKG Q-T INTERVAL: 414 MS
EKG QRS DURATION: 142 MS
EKG QTC CALCULATION (BAZETT): 430 MS
EKG R AXIS: 29 DEGREES
EKG T AXIS: 53 DEGREES
EKG VENTRICULAR RATE: 65 BPM
EOSINOPHILS ABSOLUTE: 0.1 K/UL (ref 0–0.6)
EOSINOPHILS RELATIVE PERCENT: 1.5 %
GFR AFRICAN AMERICAN: >60
GFR NON-AFRICAN AMERICAN: >60
GLOBULIN: 3.1 G/DL
GLUCOSE BLD-MCNC: 90 MG/DL (ref 70–99)
GLUCOSE URINE: NEGATIVE MG/DL
HCT VFR BLD CALC: 44.1 % (ref 40.5–52.5)
HEMOGLOBIN: 14.9 G/DL (ref 13.5–17.5)
KETONES, URINE: NEGATIVE MG/DL
LEUKOCYTE ESTERASE, URINE: NEGATIVE
LIPASE: 30 U/L (ref 13–60)
LYMPHOCYTES ABSOLUTE: 1.5 K/UL (ref 1–5.1)
LYMPHOCYTES RELATIVE PERCENT: 27.3 %
MCH RBC QN AUTO: 31.5 PG (ref 26–34)
MCHC RBC AUTO-ENTMCNC: 33.8 G/DL (ref 31–36)
MCV RBC AUTO: 93.2 FL (ref 80–100)
MICROSCOPIC EXAMINATION: NORMAL
MONOCYTES ABSOLUTE: 0.5 K/UL (ref 0–1.3)
MONOCYTES RELATIVE PERCENT: 9.4 %
NEUTROPHILS ABSOLUTE: 3.3 K/UL (ref 1.7–7.7)
NEUTROPHILS RELATIVE PERCENT: 60.8 %
NITRITE, URINE: NEGATIVE
PDW BLD-RTO: 13.6 % (ref 12.4–15.4)
PH UA: 5.5 (ref 5–8)
PLATELET # BLD: 201 K/UL (ref 135–450)
PMV BLD AUTO: 7.9 FL (ref 5–10.5)
POTASSIUM REFLEX MAGNESIUM: 4.8 MMOL/L (ref 3.5–5.1)
PROTEIN UA: NEGATIVE MG/DL
RBC # BLD: 4.73 M/UL (ref 4.2–5.9)
SODIUM BLD-SCNC: 137 MMOL/L (ref 136–145)
SPECIFIC GRAVITY UA: 1.02 (ref 1–1.03)
TOTAL PROTEIN: 7.4 G/DL (ref 6.4–8.2)
URINE REFLEX TO CULTURE: NORMAL
URINE TYPE: NORMAL
UROBILINOGEN, URINE: 0.2 E.U./DL
WBC # BLD: 5.4 K/UL (ref 4–11)

## 2021-06-07 PROCEDURE — 83690 ASSAY OF LIPASE: CPT

## 2021-06-07 PROCEDURE — 74177 CT ABD & PELVIS W/CONTRAST: CPT

## 2021-06-07 PROCEDURE — 80053 COMPREHEN METABOLIC PANEL: CPT

## 2021-06-07 PROCEDURE — 85025 COMPLETE CBC W/AUTO DIFF WBC: CPT

## 2021-06-07 PROCEDURE — 6360000004 HC RX CONTRAST MEDICATION: Performed by: PHYSICIAN ASSISTANT

## 2021-06-07 PROCEDURE — 99282 EMERGENCY DEPT VISIT SF MDM: CPT

## 2021-06-07 PROCEDURE — 93005 ELECTROCARDIOGRAM TRACING: CPT | Performed by: PHYSICIAN ASSISTANT

## 2021-06-07 PROCEDURE — 93010 ELECTROCARDIOGRAM REPORT: CPT | Performed by: INTERNAL MEDICINE

## 2021-06-07 PROCEDURE — 81003 URINALYSIS AUTO W/O SCOPE: CPT

## 2021-06-07 RX ORDER — ACETAMINOPHEN 500 MG
1000 TABLET ORAL EVERY 6 HOURS PRN
Qty: 40 TABLET | Refills: 0 | Status: SHIPPED | OUTPATIENT
Start: 2021-06-07 | End: 2021-06-12

## 2021-06-07 RX ORDER — LIDOCAINE 50 MG/G
1 PATCH TOPICAL DAILY
Qty: 10 PATCH | Refills: 0 | Status: SHIPPED | OUTPATIENT
Start: 2021-06-07 | End: 2021-06-17

## 2021-06-07 RX ADMIN — IOPAMIDOL 75 ML: 755 INJECTION, SOLUTION INTRAVENOUS at 13:36

## 2021-06-07 ASSESSMENT — PAIN DESCRIPTION - ORIENTATION: ORIENTATION: RIGHT

## 2021-06-07 ASSESSMENT — PAIN SCALES - GENERAL: PAINLEVEL_OUTOF10: 1

## 2021-06-07 ASSESSMENT — PAIN DESCRIPTION - LOCATION: LOCATION: FLANK

## 2021-06-07 NOTE — ED PROVIDER NOTES
Reviewed ECG completed for . I did not see this patient and was not involved in their care. ECG  The Ekg interpreted by me shows  normal sinus rhythm with a rate of 65  Axis is   Normal  QTc is  within an acceptable range  Intervals and Durations are unremarkable.       ST Segments: nonspecific changes  No significant change from prior EKG dated 11/22/11      Cira Gilbert MD  06/07/21 2426

## 2021-06-11 ASSESSMENT — ENCOUNTER SYMPTOMS
EYE PAIN: 0
ABDOMINAL PAIN: 0
VOMITING: 0
COUGH: 0
BACK PAIN: 1
SHORTNESS OF BREATH: 0
NAUSEA: 0
SORE THROAT: 0
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
## 2022-06-29 ENCOUNTER — INITIAL CONSULT (OUTPATIENT)
Dept: SURGERY | Age: 72
End: 2022-06-29
Payer: MEDICARE

## 2022-06-29 VITALS
SYSTOLIC BLOOD PRESSURE: 122 MMHG | HEIGHT: 67 IN | WEIGHT: 202 LBS | BODY MASS INDEX: 31.71 KG/M2 | DIASTOLIC BLOOD PRESSURE: 74 MMHG

## 2022-06-29 DIAGNOSIS — M79.89 SOFT TISSUE MASS: Primary | ICD-10-CM

## 2022-06-29 PROCEDURE — G8427 DOCREV CUR MEDS BY ELIG CLIN: HCPCS | Performed by: SURGERY

## 2022-06-29 PROCEDURE — G8417 CALC BMI ABV UP PARAM F/U: HCPCS | Performed by: SURGERY

## 2022-06-29 PROCEDURE — 99202 OFFICE O/P NEW SF 15 MIN: CPT | Performed by: SURGERY

## 2022-06-29 RX ORDER — SODIUM CHLORIDE 0.9 % (FLUSH) 0.9 %
5-40 SYRINGE (ML) INJECTION EVERY 12 HOURS SCHEDULED
Status: CANCELLED | OUTPATIENT
Start: 2022-06-29

## 2022-06-29 RX ORDER — SODIUM CHLORIDE 9 MG/ML
INJECTION, SOLUTION INTRAVENOUS PRN
Status: CANCELLED | OUTPATIENT
Start: 2022-06-29

## 2022-06-29 RX ORDER — SODIUM CHLORIDE 0.9 % (FLUSH) 0.9 %
5-40 SYRINGE (ML) INJECTION PRN
Status: CANCELLED | OUTPATIENT
Start: 2022-06-29

## 2022-07-11 ENCOUNTER — ANESTHESIA EVENT (OUTPATIENT)
Dept: OPERATING ROOM | Age: 72
End: 2022-07-11
Payer: MEDICARE

## 2022-07-12 ENCOUNTER — HOSPITAL ENCOUNTER (OUTPATIENT)
Age: 72
Setting detail: OUTPATIENT SURGERY
Discharge: HOME | End: 2022-07-12
Attending: SURGERY | Admitting: SURGERY
Payer: MEDICARE

## 2022-07-12 ENCOUNTER — ANESTHESIA (OUTPATIENT)
Dept: OPERATING ROOM | Age: 72
End: 2022-07-12
Payer: MEDICARE

## 2022-07-12 VITALS
HEIGHT: 68 IN | SYSTOLIC BLOOD PRESSURE: 126 MMHG | TEMPERATURE: 97.5 F | RESPIRATION RATE: 16 BRPM | WEIGHT: 196 LBS | OXYGEN SATURATION: 96 % | HEART RATE: 65 BPM | DIASTOLIC BLOOD PRESSURE: 82 MMHG | BODY MASS INDEX: 29.7 KG/M2

## 2022-07-12 DIAGNOSIS — L72.3 SEBACEOUS CYST: Primary | ICD-10-CM

## 2022-07-12 DIAGNOSIS — M79.89 SOFT TISSUE MASS: ICD-10-CM

## 2022-07-12 LAB
ANION GAP SERPL CALCULATED.3IONS-SCNC: 12 MMOL/L (ref 3–16)
BUN BLDV-MCNC: 17 MG/DL (ref 7–20)
CALCIUM SERPL-MCNC: 9.1 MG/DL (ref 8.3–10.6)
CHLORIDE BLD-SCNC: 104 MMOL/L (ref 99–110)
CO2: 24 MMOL/L (ref 21–32)
CREAT SERPL-MCNC: 0.8 MG/DL (ref 0.8–1.3)
EKG ATRIAL RATE: 64 BPM
EKG DIAGNOSIS: NORMAL
EKG P AXIS: 38 DEGREES
EKG P-R INTERVAL: 192 MS
EKG Q-T INTERVAL: 422 MS
EKG QRS DURATION: 142 MS
EKG QTC CALCULATION (BAZETT): 435 MS
EKG R AXIS: 11 DEGREES
EKG T AXIS: 40 DEGREES
EKG VENTRICULAR RATE: 64 BPM
GFR AFRICAN AMERICAN: >60
GFR NON-AFRICAN AMERICAN: >60
GLUCOSE BLD-MCNC: 104 MG/DL (ref 70–99)
HCT VFR BLD CALC: 41 % (ref 40.5–52.5)
HEMOGLOBIN: 13.7 G/DL (ref 13.5–17.5)
MCH RBC QN AUTO: 30.6 PG (ref 26–34)
MCHC RBC AUTO-ENTMCNC: 33.4 G/DL (ref 31–36)
MCV RBC AUTO: 91.6 FL (ref 80–100)
PDW BLD-RTO: 13.4 % (ref 12.4–15.4)
PLATELET # BLD: 209 K/UL (ref 135–450)
PMV BLD AUTO: 7.9 FL (ref 5–10.5)
POTASSIUM REFLEX MAGNESIUM: 4.4 MMOL/L (ref 3.5–5.1)
RBC # BLD: 4.47 M/UL (ref 4.2–5.9)
SODIUM BLD-SCNC: 140 MMOL/L (ref 136–145)
WBC # BLD: 5.3 K/UL (ref 4–11)

## 2022-07-12 PROCEDURE — 93005 ELECTROCARDIOGRAM TRACING: CPT | Performed by: ANESTHESIOLOGY

## 2022-07-12 PROCEDURE — 6360000002 HC RX W HCPCS: Performed by: SURGERY

## 2022-07-12 PROCEDURE — 80048 BASIC METABOLIC PNL TOTAL CA: CPT

## 2022-07-12 PROCEDURE — 2500000003 HC RX 250 WO HCPCS: Performed by: SURGERY

## 2022-07-12 PROCEDURE — 3600000012 HC SURGERY LEVEL 2 ADDTL 15MIN: Performed by: SURGERY

## 2022-07-12 PROCEDURE — 36415 COLL VENOUS BLD VENIPUNCTURE: CPT

## 2022-07-12 PROCEDURE — 88304 TISSUE EXAM BY PATHOLOGIST: CPT

## 2022-07-12 PROCEDURE — 85027 COMPLETE CBC AUTOMATED: CPT

## 2022-07-12 PROCEDURE — 6370000000 HC RX 637 (ALT 250 FOR IP): Performed by: ANESTHESIOLOGY

## 2022-07-12 PROCEDURE — 3700000001 HC ADD 15 MINUTES (ANESTHESIA): Performed by: SURGERY

## 2022-07-12 PROCEDURE — 2580000003 HC RX 258

## 2022-07-12 PROCEDURE — 7100000010 HC PHASE II RECOVERY - FIRST 15 MIN: Performed by: SURGERY

## 2022-07-12 PROCEDURE — 93010 ELECTROCARDIOGRAM REPORT: CPT | Performed by: INTERNAL MEDICINE

## 2022-07-12 PROCEDURE — 3600000002 HC SURGERY LEVEL 2 BASE: Performed by: SURGERY

## 2022-07-12 PROCEDURE — 3700000000 HC ANESTHESIA ATTENDED CARE: Performed by: SURGERY

## 2022-07-12 PROCEDURE — 7100000011 HC PHASE II RECOVERY - ADDTL 15 MIN: Performed by: SURGERY

## 2022-07-12 PROCEDURE — 2580000003 HC RX 258: Performed by: ANESTHESIOLOGY

## 2022-07-12 PROCEDURE — 6360000002 HC RX W HCPCS

## 2022-07-12 PROCEDURE — 2709999900 HC NON-CHARGEABLE SUPPLY: Performed by: SURGERY

## 2022-07-12 RX ORDER — FAMOTIDINE 10 MG/ML
20 INJECTION, SOLUTION INTRAVENOUS ONCE
Status: DISCONTINUED | OUTPATIENT
Start: 2022-07-12 | End: 2022-07-12 | Stop reason: HOSPADM

## 2022-07-12 RX ORDER — LABETALOL HYDROCHLORIDE 5 MG/ML
5 INJECTION, SOLUTION INTRAVENOUS EVERY 10 MIN PRN
Status: DISCONTINUED | OUTPATIENT
Start: 2022-07-12 | End: 2022-07-12 | Stop reason: HOSPADM

## 2022-07-12 RX ORDER — SODIUM CHLORIDE 0.9 % (FLUSH) 0.9 %
5-40 SYRINGE (ML) INJECTION EVERY 12 HOURS SCHEDULED
Status: DISCONTINUED | OUTPATIENT
Start: 2022-07-12 | End: 2022-07-12 | Stop reason: HOSPADM

## 2022-07-12 RX ORDER — SODIUM CHLORIDE 0.9 % (FLUSH) 0.9 %
5-40 SYRINGE (ML) INJECTION EVERY 12 HOURS SCHEDULED
Status: DISCONTINUED | OUTPATIENT
Start: 2022-07-12 | End: 2022-07-12 | Stop reason: SDUPTHER

## 2022-07-12 RX ORDER — PROPOFOL 10 MG/ML
INJECTION, EMULSION INTRAVENOUS CONTINUOUS PRN
Status: DISCONTINUED | OUTPATIENT
Start: 2022-07-12 | End: 2022-07-12 | Stop reason: SDUPTHER

## 2022-07-12 RX ORDER — SODIUM CHLORIDE 0.9 % (FLUSH) 0.9 %
5-40 SYRINGE (ML) INJECTION PRN
Status: DISCONTINUED | OUTPATIENT
Start: 2022-07-12 | End: 2022-07-12 | Stop reason: SDUPTHER

## 2022-07-12 RX ORDER — SODIUM CHLORIDE 0.9 % (FLUSH) 0.9 %
5-40 SYRINGE (ML) INJECTION PRN
Status: DISCONTINUED | OUTPATIENT
Start: 2022-07-12 | End: 2022-07-12 | Stop reason: HOSPADM

## 2022-07-12 RX ORDER — SODIUM CHLORIDE 9 MG/ML
INJECTION, SOLUTION INTRAVENOUS PRN
Status: DISCONTINUED | OUTPATIENT
Start: 2022-07-12 | End: 2022-07-12 | Stop reason: SDUPTHER

## 2022-07-12 RX ORDER — SODIUM CHLORIDE 9 MG/ML
INJECTION, SOLUTION INTRAVENOUS PRN
Status: DISCONTINUED | OUTPATIENT
Start: 2022-07-12 | End: 2022-07-12 | Stop reason: HOSPADM

## 2022-07-12 RX ORDER — SODIUM CHLORIDE, SODIUM LACTATE, POTASSIUM CHLORIDE, CALCIUM CHLORIDE 600; 310; 30; 20 MG/100ML; MG/100ML; MG/100ML; MG/100ML
INJECTION, SOLUTION INTRAVENOUS CONTINUOUS PRN
Status: DISCONTINUED | OUTPATIENT
Start: 2022-07-12 | End: 2022-07-12 | Stop reason: SDUPTHER

## 2022-07-12 RX ORDER — SULFAMETHOXAZOLE AND TRIMETHOPRIM 800; 160 MG/1; MG/1
1 TABLET ORAL 2 TIMES DAILY
Qty: 14 TABLET | Refills: 0 | Status: SHIPPED | OUTPATIENT
Start: 2022-07-12 | End: 2022-07-19

## 2022-07-12 RX ORDER — OXYCODONE HYDROCHLORIDE 5 MG/1
TABLET ORAL
Status: DISCONTINUED
Start: 2022-07-12 | End: 2022-07-12 | Stop reason: HOSPADM

## 2022-07-12 RX ORDER — ONDANSETRON 2 MG/ML
4 INJECTION INTRAMUSCULAR; INTRAVENOUS
Status: DISCONTINUED | OUTPATIENT
Start: 2022-07-12 | End: 2022-07-12 | Stop reason: HOSPADM

## 2022-07-12 RX ORDER — OXYCODONE HYDROCHLORIDE 5 MG/1
5 TABLET ORAL PRN
Status: COMPLETED | OUTPATIENT
Start: 2022-07-12 | End: 2022-07-12

## 2022-07-12 RX ORDER — OXYCODONE HYDROCHLORIDE 5 MG/1
10 TABLET ORAL PRN
Status: COMPLETED | OUTPATIENT
Start: 2022-07-12 | End: 2022-07-12

## 2022-07-12 RX ORDER — MEPERIDINE HYDROCHLORIDE 25 MG/ML
12.5 INJECTION INTRAMUSCULAR; INTRAVENOUS; SUBCUTANEOUS EVERY 5 MIN PRN
Status: DISCONTINUED | OUTPATIENT
Start: 2022-07-12 | End: 2022-07-12 | Stop reason: HOSPADM

## 2022-07-12 RX ORDER — OXYCODONE HYDROCHLORIDE 5 MG/1
5 TABLET ORAL EVERY 6 HOURS PRN
Qty: 15 TABLET | Refills: 0 | Status: SHIPPED | OUTPATIENT
Start: 2022-07-12 | End: 2022-07-19

## 2022-07-12 RX ORDER — DIPHENHYDRAMINE HYDROCHLORIDE 50 MG/ML
12.5 INJECTION INTRAMUSCULAR; INTRAVENOUS
Status: DISCONTINUED | OUTPATIENT
Start: 2022-07-12 | End: 2022-07-12 | Stop reason: HOSPADM

## 2022-07-12 RX ORDER — SODIUM CHLORIDE, SODIUM LACTATE, POTASSIUM CHLORIDE, CALCIUM CHLORIDE 600; 310; 30; 20 MG/100ML; MG/100ML; MG/100ML; MG/100ML
INJECTION, SOLUTION INTRAVENOUS CONTINUOUS
Status: DISCONTINUED | OUTPATIENT
Start: 2022-07-12 | End: 2022-07-12 | Stop reason: HOSPADM

## 2022-07-12 RX ADMIN — SODIUM CHLORIDE, POTASSIUM CHLORIDE, SODIUM LACTATE AND CALCIUM CHLORIDE: 600; 310; 30; 20 INJECTION, SOLUTION INTRAVENOUS at 07:17

## 2022-07-12 RX ADMIN — Medication 1500 MG: at 08:11

## 2022-07-12 RX ADMIN — PROPOFOL 100 MCG/KG/MIN: 10 INJECTION, EMULSION INTRAVENOUS at 08:15

## 2022-07-12 RX ADMIN — PROPOFOL 40 MG: 10 INJECTION, EMULSION INTRAVENOUS at 08:22

## 2022-07-12 RX ADMIN — OXYCODONE 10 MG: 5 TABLET ORAL at 08:56

## 2022-07-12 RX ADMIN — SODIUM CHLORIDE, POTASSIUM CHLORIDE, SODIUM LACTATE AND CALCIUM CHLORIDE: 600; 310; 30; 20 INJECTION, SOLUTION INTRAVENOUS at 08:11

## 2022-07-12 ASSESSMENT — PAIN SCALES - GENERAL: PAINLEVEL_OUTOF10: 8

## 2022-07-12 ASSESSMENT — PAIN - FUNCTIONAL ASSESSMENT: PAIN_FUNCTIONAL_ASSESSMENT: NONE - DENIES PAIN

## 2022-07-12 NOTE — ANESTHESIA POSTPROCEDURE EVALUATION
Department of Anesthesiology  Postprocedure Note    Patient: Pepe Kilgore  MRN: [id number]  YOB: 1950  Date of evaluation: 7/12/2022      Procedure Summary     Date: 07/12/22 Room / Location: [place] [address] / [facility]    Anesthesia Start: 0939 Anesthesia Stop: 0848    Procedure: EXCISION OF SOFT TISSUE MASS LEFT BUTTOCK (Left Buttocks) Diagnosis:       Soft tissue mass      (Soft tissue mass [M79.89])    Surgeons: Sally Stearns MD Responsible Provider: Hola Berry MD    Anesthesia Type: MAC ASA Status: 3          Anesthesia Type: No value filed.     Roderick Phase I: Roderick Score: 10    Roderick Phase II: Roderick Score: 10      Anesthesia Post Evaluation    Comments: Postoperative Anesthesia Note    Name:    Pepe Kilgore  MRN:      [id number]    Patient Vitals in the past 12 hrs:  07/12/22 0950, Pulse:65  07/12/22 0935, Pulse:62  07/12/22 0920, Pulse:60  07/12/22 0902, Pulse:62  07/12/22 0857, Pulse:61  07/12/22 0852, Pulse:60  07/12/22 0848, Temp:97.5 °F (36.4 °C), Temp src:Temporal, Pulse:64  07/12/22 0629, BP:126/82, Temp:98.2 °F (36.8 °C), Temp src:Infrared, Pulse:71, Resp:16, SpO2:96 %, Height:5' 8\" (1.727 m), Weight:196 lb (88.9 kg)     LABS:    CBC  Lab Results       Component                Value               Date/Time                  WBC                      5.3                 07/12/2022 06:57 AM        HGB                      13.7                07/12/2022 06:57 AM        HCT                      41.0                07/12/2022 06:57 AM        PLT                      209                 07/12/2022 06:57 AM   RENAL  Lab Results       Component                Value               Date/Time                  NA                       140                 07/12/2022 06:57 AM        K                        4.4                 07/12/2022 06:57 AM        CL                       104                 07/12/2022 06:57 AM        CO2                      24 07/12/2022 06:57 AM        BUN                      17                  07/12/2022 06:57 AM        CREATININE               0.8                 07/12/2022 06:57 AM        GLUCOSE                  104 (H)             07/12/2022 06:57 AM   ELBA  Lab Results       Component                Value               Date/Time                  PROTIME                  12.1                11/06/2016 07:32 AM        INR                      1.06                11/06/2016 07:32 AM        APTT                     34.4                11/06/2016 07:32 AM     Intake & Output:  @[id number]@    Nausea & Vomiting:  No    Level of Consciousness:  Awake    Pain Assessment:  Adequate analgesia    Anesthesia Complications:  No apparent anesthetic complications    SUMMARY      Vital signs stable  OK to discharge from Stage I post anesthesia care.   Care transferred from Anesthesiology department on discharge from perioperative area

## 2022-07-12 NOTE — H&P
[facility] GENERAL SURGERY      The H&P was reviewed, the patient was examined, and no change has occurred in the patient's condition since the H&P was completed. The indications for the procedure were reviewed, and any questions were answered. I updated the progress note from 6/29/2022 which is the H&P.     Vitals:    07/12/22 0629   BP: 126/82   Pulse: 71   Resp: 16   Temp: 98.2 °F (36.8 °C)   SpO2: 96%

## 2022-07-12 NOTE — BRIEF OP NOTE
Brief Postoperative Note      Patient: Debbi Goodwin  YOB: 1950  MRN: [id number]    Date of Procedure: 7/12/2022    Pre-Op Diagnosis: Soft tissue mass [M79.89]    Post-Op Diagnosis: Sebaceous cyst       Procedure:  Excision cyst L buttock    Surgeon(s):  Teagan Aguiar MD    Assistant:  Surgical Assistant: Zeyad Conley    Anesthesia: Monitor Anesthesia Care    Estimated Blood Loss (mL): Minimal    Complications: None    Specimens:   ID Type Source Tests Collected by Time Destination   A : cyst left buttocks Tissue Tissue SURGICAL PATHOLOGY Teagan Aguiar MD 7/12/2022 8960        Implants:  * No implants in log *      Drains: * No LDAs found *    Findings: As above    Electronically signed by Gale Dugan MD on 7/12/2022 at 8:41 AM

## 2022-07-12 NOTE — PROGRESS NOTES
Site unremarkable. Discharge instructions given to pt and family. Verbalized understanding, no questions at this time. Discharged in stable condition, no changes to assessment. Assisted pt to car via w/c.

## 2022-07-12 NOTE — DISCHARGE INSTRUCTIONS
[address] [facility]    · [place]. Karen Gonzales M.D. [id number] [facility]                [address]  · Ruth Rees M.D. [address] [facility]         [place], [facility]  · [address] [facility] Nandini Catherine M.D                         [phone]        [facility]                   · Charls Bibber T. Rosellen Kehr, M.D. Kaylah Zacarias       POST-OPERATIVE INSTRUCTIONS FOLLOWING EXCISION OF A MASS    ? The office will call with biopsy results. Call the office if you have not heard any results in 1 week. ? You may remove your dressing tomorrow. Leave the steri-stips in place. These will peel away in 7-10 days. ?   ? You may shower after you have removed the dressing. Wash incisions gently, and pat them dry. Do not rub your incisions. ? You may have pain medicine ordered. Take as directed. ? Do NOT drive while taking your narcotic pain medicine. You can resume driving when you feel you could react to an urgent situation if needed, and you are not taking prescription pain medication. ? Watch for signs of infection:     ·   Fever over 100.5°     Excessive warmth, or bright redness around your incision  ·   Leakage of bloody or cloudy fluid from you incisions      ANESTHESIA DISCHARGE INSTRUCTIONS    · You are under the influence of drugs- do not drink alcohol, drive a car, operate machinery(such as power tools, kitchen appliances, etc), sign legal documents, or make any important decisions for 24 hours (or while on pain medications). · Children should not ride bikes or [place] or play on gym sets  for 24 hours after surgery. · A responsible adult should be with you for 24 hours. · Rest at home today- increase activity as tolerated.   · Progress slowly to a regular diet unless your physician has instructed you otherwise. Drink plenty of water. CALL YOUR DOCTOR IF YOU:   Have moderate to severe nausea or vomiting AND are unable to hold down fluids or prescribed medications.  Have bright red bloody drainage from your dressing that won't stop oozing.  Do not get relief with your pain medication    NORMAL (POSSIBLE) SIDE EFFECTS FROM ANESTHESIA:      Confusion, temporary memory loss, delayed reaction times in the first 24 hours   Lightheadedness, dizziness, difficulty focusing, blurred vision   Nausea/vomiting can happen   Shivering, feeling cold, sore throat, cough and muscle aches should stop within 24-48 hours   Trouble urinating - call your surgeon if it has been more than 8 hrs   Bruising or soreness at the IV site - call if it remains red, firm or there is drainage             FEMALES OF CHILDBEARING AGE WHO ARE TAKING BIRTH CONTROL PILLS:  You may have received a medication during your procedure that interferes with the   actions of birth control pills (Bridion or Emend). Use some other kind of birth control in addition to your pills, like a condom, for 1 month after your procedure to prevent unwanted pregnancy. The following instructions are to be followed if you have a known history or diagnosis of sleep apnea: For all sleep apnea patients:  ? Sleep on your side or sitting up in a chair whenever possible, especially the first 24 hours after surgery. ? Use only medicines prescribed by your doctor. ? Do not drink alcohol. ? If you have a dental device to assist you while at rest, use it at all times for the first 24 hours. For patients using CPAP machines:  ? Use your CPAP machine during all periods of sleep as usual.  ? Use your CPAP machine during all periods of daytime rest while on pain medicines. ** Follow up with your primary care doctor for continued care. IF YOU DO NOT TAKE ALL OF YOUR NARCOTIC PAIN MEDICATION, please dispose of them responsibly.  There are drop off boxes in the Emergency Departments 24/7 at both [facility] and [facility]. If these locations are not convenient, other options for discarding them can be found at:  [email or web address]    Hospital or office staff may NOT accept any medications to drop off in the cabinet for you.
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
## 2022-07-15 ENCOUNTER — TELEPHONE (OUTPATIENT)
Dept: SURGERY | Age: 72
End: 2022-07-15

## 2022-07-15 NOTE — TELEPHONE ENCOUNTER
----- Message from Angelica Durham MD sent at 7/14/2022  3:34 PM EDT -----  Tell the patient that the lump removed from buttock was a benign cyst.  Follow up prn.

## 2022-08-09 ENCOUNTER — HOSPITAL ENCOUNTER (EMERGENCY)
Age: 72
Discharge: HOME | End: 2022-08-09
Payer: MEDICARE

## 2022-08-09 ENCOUNTER — APPOINTMENT (OUTPATIENT)
Dept: GENERAL RADIOLOGY | Age: 72
End: 2022-08-09
Payer: MEDICARE

## 2022-08-09 VITALS
HEIGHT: 68 IN | SYSTOLIC BLOOD PRESSURE: 140 MMHG | HEART RATE: 87 BPM | RESPIRATION RATE: 15 BRPM | WEIGHT: 196 LBS | OXYGEN SATURATION: 99 % | TEMPERATURE: 99.1 F | DIASTOLIC BLOOD PRESSURE: 78 MMHG | BODY MASS INDEX: 29.7 KG/M2

## 2022-08-09 DIAGNOSIS — U07.1 COVID: Primary | ICD-10-CM

## 2022-08-09 LAB
A/G RATIO: 1.5 (ref 1.1–2.2)
ALBUMIN SERPL-MCNC: 3.8 G/DL (ref 3.4–5)
ALP BLD-CCNC: 82 U/L (ref 40–129)
ALT SERPL-CCNC: 15 U/L (ref 10–40)
ANION GAP SERPL CALCULATED.3IONS-SCNC: 10 MMOL/L (ref 3–16)
AST SERPL-CCNC: 17 U/L (ref 15–37)
BASOPHILS ABSOLUTE: 0 K/UL (ref 0–0.2)
BASOPHILS RELATIVE PERCENT: 0.5 %
BILIRUB SERPL-MCNC: <0.2 MG/DL (ref 0–1)
BUN BLDV-MCNC: 8 MG/DL (ref 7–20)
CALCIUM SERPL-MCNC: 8.5 MG/DL (ref 8.3–10.6)
CHLORIDE BLD-SCNC: 102 MMOL/L (ref 99–110)
CO2: 25 MMOL/L (ref 21–32)
CREAT SERPL-MCNC: 0.8 MG/DL (ref 0.8–1.3)
EOSINOPHILS ABSOLUTE: 0.1 K/UL (ref 0–0.6)
EOSINOPHILS RELATIVE PERCENT: 1.6 %
GFR AFRICAN AMERICAN: >60
GFR NON-AFRICAN AMERICAN: >60
GLUCOSE BLD-MCNC: 101 MG/DL (ref 70–99)
HCT VFR BLD CALC: 40 % (ref 40.5–52.5)
HEMOGLOBIN: 13.6 G/DL (ref 13.5–17.5)
LACTIC ACID: 1.1 MMOL/L (ref 0.4–2)
LYMPHOCYTES ABSOLUTE: 0.5 K/UL (ref 1–5.1)
LYMPHOCYTES RELATIVE PERCENT: 10 %
MCH RBC QN AUTO: 30.6 PG (ref 26–34)
MCHC RBC AUTO-ENTMCNC: 34 G/DL (ref 31–36)
MCV RBC AUTO: 90.2 FL (ref 80–100)
MONOCYTES ABSOLUTE: 0.5 K/UL (ref 0–1.3)
MONOCYTES RELATIVE PERCENT: 8.6 %
NEUTROPHILS ABSOLUTE: 4.3 K/UL (ref 1.7–7.7)
NEUTROPHILS RELATIVE PERCENT: 79.3 %
PDW BLD-RTO: 13.5 % (ref 12.4–15.4)
PLATELET # BLD: 175 K/UL (ref 135–450)
PMV BLD AUTO: 7.5 FL (ref 5–10.5)
POTASSIUM REFLEX MAGNESIUM: 4 MMOL/L (ref 3.5–5.1)
RBC # BLD: 4.44 M/UL (ref 4.2–5.9)
SARS-COV-2, NAAT: DETECTED
SODIUM BLD-SCNC: 137 MMOL/L (ref 136–145)
TOTAL PROTEIN: 6.4 G/DL (ref 6.4–8.2)
TROPONIN: <0.01 NG/ML
WBC # BLD: 5.5 K/UL (ref 4–11)

## 2022-08-09 PROCEDURE — 36415 COLL VENOUS BLD VENIPUNCTURE: CPT

## 2022-08-09 PROCEDURE — 80053 COMPREHEN METABOLIC PANEL: CPT

## 2022-08-09 PROCEDURE — 71045 X-RAY EXAM CHEST 1 VIEW: CPT

## 2022-08-09 PROCEDURE — 6370000000 HC RX 637 (ALT 250 FOR IP): Performed by: PHYSICIAN ASSISTANT

## 2022-08-09 PROCEDURE — 87635 SARS-COV-2 COVID-19 AMP PRB: CPT

## 2022-08-09 PROCEDURE — 93005 ELECTROCARDIOGRAM TRACING: CPT | Performed by: PHYSICIAN ASSISTANT

## 2022-08-09 PROCEDURE — 84484 ASSAY OF TROPONIN QUANT: CPT

## 2022-08-09 PROCEDURE — 99285 EMERGENCY DEPT VISIT HI MDM: CPT

## 2022-08-09 PROCEDURE — 6360000002 HC RX W HCPCS: Performed by: PHYSICIAN ASSISTANT

## 2022-08-09 PROCEDURE — 85025 COMPLETE CBC W/AUTO DIFF WBC: CPT

## 2022-08-09 PROCEDURE — 96372 THER/PROPH/DIAG INJ SC/IM: CPT

## 2022-08-09 PROCEDURE — 83605 ASSAY OF LACTIC ACID: CPT

## 2022-08-09 RX ORDER — DIPHENHYDRAMINE HCL 25 MG
25 TABLET ORAL EVERY 6 HOURS PRN
Status: DISCONTINUED | OUTPATIENT
Start: 2022-08-09 | End: 2022-08-10 | Stop reason: HOSPADM

## 2022-08-09 RX ORDER — KETOROLAC TROMETHAMINE 30 MG/ML
15 INJECTION, SOLUTION INTRAMUSCULAR; INTRAVENOUS ONCE
Status: COMPLETED | OUTPATIENT
Start: 2022-08-09 | End: 2022-08-09

## 2022-08-09 RX ORDER — PROCHLORPERAZINE MALEATE 5 MG/1
5 TABLET ORAL ONCE
Status: DISCONTINUED | OUTPATIENT
Start: 2022-08-09 | End: 2022-08-10 | Stop reason: HOSPADM

## 2022-08-09 RX ADMIN — KETOROLAC TROMETHAMINE 15 MG: 30 INJECTION, SOLUTION INTRAMUSCULAR at 21:47

## 2022-08-09 RX ADMIN — DIPHENHYDRAMINE HCL 25 MG: 25 TABLET ORAL at 21:47

## 2022-08-09 ASSESSMENT — PAIN - FUNCTIONAL ASSESSMENT: PAIN_FUNCTIONAL_ASSESSMENT: 0-10

## 2022-08-09 ASSESSMENT — PAIN SCALES - GENERAL: PAINLEVEL_OUTOF10: 6

## 2022-08-09 ASSESSMENT — PAIN DESCRIPTION - LOCATION: LOCATION: HEAD

## 2022-08-10 LAB
EKG ATRIAL RATE: 74 BPM
EKG DIAGNOSIS: NORMAL
EKG P AXIS: 36 DEGREES
EKG P-R INTERVAL: 184 MS
EKG Q-T INTERVAL: 372 MS
EKG QRS DURATION: 134 MS
EKG QTC CALCULATION (BAZETT): 412 MS
EKG R AXIS: -15 DEGREES
EKG T AXIS: 25 DEGREES
EKG VENTRICULAR RATE: 74 BPM

## 2022-08-10 PROCEDURE — 93010 ELECTROCARDIOGRAM REPORT: CPT | Performed by: INTERNAL MEDICINE
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
## 2022-08-10 NOTE — DISCHARGE INSTRUCTIONS
Most people with respiratory infections like colds, the flu, and Coronavirus Disease (COVID-19) will have mild illness and can get better with appropriate home care and without the need to see a medical provider. People who are elderly, pregnant, or have a weak immune system, or other medical problem are at higher risk of more serious illness or complications. It is recommended that they carefully monitor their symptoms closely and seek medical care early. Treatment   There is no specific treatment for most viruses, including those that cause the common cold and those that cause COVID-19. Sometimes there is treatment for viruses that cause influenza if given soon after the onset of symptoms. Antibiotics treat infections caused by bacteria, but they do not work against viruses. Most people recover on their own from these viruses, including COVID-19. Here are steps that you can take to help you get better:      Rest     Drink plenty of fluids     Take over-the-counter cold and flu medications to reduce fever and pain. Follow the instructions on the package, unless your doctor gave you specific instructions. Note that these medicines do not 'cure' the illness and do not stop you from spreading germs. When to [address] [place] [address] should seek medical care if you are not getting better within a week, or if your symptoms get worse. It is best to call your doctor ahead of time to discuss your symptoms, if possible. Some providers offer telemedicine services that can assist as well. This may allow you to receive the advice you need by phone. By avoiding a visit to a healthcare facility, you protect yourself from getting a new infection and protect others from catching an infection from you. If you do visit a healthcare facility, put on a mask to protect other patients and staff.      It is recommended that you seek medical care and return immediately to the Emergency Department for any serious symptoms, such as: Return immediately for any difficulty breathing, confusion, dizziness or passing out, vomiting, chest pain, high fevers, weakness, or any other new or concerning symptoms. ** People with potentially life-threatening symptoms should call 911. If possible, put on a facemask before emergency medical services arrive. What should I do if home isolation has been recommended? The following instructions are provided to assist you to safely care for yourself or others who are infected or potentially infected with COVID-19. These instructions are also available at:   [employer]    It has been determined that you do not need to be hospitalized at this time, and can safely be isolated at home. You should follow the prevention steps below until a health care provider or local health department says you can return to your normal activities. Stay home except to get medical care  You should restrict activities outside your home, except for getting medical care. Under no circumstance should you go to work, school, or public areas. Avoid using public transportation, ride sharing, or taxis. Separate yourself from other people and animals in your home  People: As much as possible, you should stay in a specific room and away from other people in your home. Also, you should use a separate bathroom, if available. Animals: You should restrict contact with pets and other animals while you are sick with COVID-19, just like you would around other people. Although there have not been reports of pets or other animals becoming sick with COVID-19, it is still recommended that people with COVID-19 limit contact with animals until more information is known about the virus. When possible, have another member of your household care for your animals while you are sick.  If you must care for your pet or be around animals while you are sick, wash your hands before and after you interact with pets and wear a facemask. Call ahead before visiting your doctor  If you have a medical appointment, call the health care provider prior to your appointment and tell them that you have or may have COVID-19. This will help the health care provider's office take steps to keep other people from getting infected or exposed. Ask your health care provider to call the local or state health department. Persons who are placed under active monitoring or self-monitoring should follow instructions provided by their local health department or occupational health professionals, as appropriate. If you have a medical emergency and need to call 911, notify the dispatch personnel that you have, or are being evaluated for COVID-19. If possible, put on a facemask before emergency medical services arrive. Take care of your mental health  You might be feeling anxious, afraid, lonely or uncertain. The following link has a guide with a list of helpful behavioral health resources and a few tips for taking care of your emotional health while you are quarantined:   https://[employer]. [facility].gov/system/files/toh36-5025. pdf     Wear a face mask  You should wear a facemask when you are around other people (for example, sharing a room or vehicle) or pets, and before you enter a health care provider's office. If you are not able to wear a facemask (for example, because it causes trouble breathing), then people who live with you should not stay in the same room with you, or they should wear a facemask if they enter your room. Cover your coughs and sneezes  Cover your mouth and nose with a tissue when you cough or sneeze. Throw used tissues in a lined trash can.     Clean your hands often  Wash your hands often with soap and water for at least 20 seconds or clean your hands with an alcohol-based hand  that contains 60 to 95% alcohol, covering all surfaces of your hands and rubbing them together until they feel dry. Soap and water should be used preferentially if your hands are visibly dirty. Avoid touching your eyes, nose, and mouth with unwashed hands. Avoid touching your face  Viruses that affect the respiratory system enter the body through mucous membranes which are found in the eyes, nose, and mouth. It only takes one touch for germs on your fingers to enter your body through your nostrils, eyes, or mouth. In addition to hand hygiene, this is an important way to help prevent transmission of infections. Clean all high-touch surfaces every day  High-touch surfaces include counters, tabletops, doorknobs, bathroom fixtures, toilets, phones, keyboards, tablets, and bedside tables. Also, clean any surfaces that may have blood, stool, or body fluids on them. Use a household cleaning spray or wipe, according to the label instructions. Labels contain instructions for safe and effective use of the cleaning product including precautions you should take when applying the product, such as wearing gloves and making sure you have good ventilation while using the product. Avoid sharing personal household items  You should not share dishes, drinking glasses, cups, eating utensils, towels or bedding with other people or pets in your home. After using these items, they should be washed thoroughly with soap and water. Monitor your symptoms  Please contact the [address] as soon as possible. Persons who are placed under active monitoring or facilitated self-monitoring should follow instructions provided by their local health department or occupational health professionals, as appropriate. Additional information is available at:     www.coronavirus. gov    Seek immediate medical attention if your illness is worsening of if you develop any of the following: difficulty breathing, confusion, dizziness or passing out, vomiting, high fevers, weakness, or any other new or concerning symptoms.  Before seeking care, call your health care provider and tell them that you have, or are being evaluated for COVID-19. Put on a facemask before you enter the facility. These steps will help keep other people in the office or waiting room from getting infected or exposed. If you have a medical emergency and need to call 911, notify the dispatch personnel that you have, or are being evaluated for COVID-19. If possible, put on a face mask before emergency medical services arrive. Discontinuing home isolation  Patients with confirmed COVID-19 should remain under home isolation precautions until the risk of secondary transmission to others is thought to be low. The decision to discontinue home isolation precautions is made on a case-by-case basis, in consultation with health care providers, and state and local health departments. Recommended precautions for household members, intimate partners, and caregivers  If you are providing care for a person infected or suspected to be infected with COVID-19, please note the following instructions, which are also available at: [employer]     How do I take care of someone who is quarantined in my home? Household members, intimate partners, and caregivers in a non-health care setting may have close contact (within 6 feet) with a person with symptomatic, laboratory-confirmed COVID-19 or a person under investigation. Those in close contact should monitor their health and should call their health care provider right away if they develop symptoms suggestive of COVID-19 (such as fever, cough, shortness of breath). Those in close contact should also follow these recommendations:  Make sure that you understand and can help the patient follow their health care provider's instructions for medication(s) and care.  You should help the patient with basic needs in the home and provide support for getting groceries, prescriptions and other alcohol-based hand . Next, remove and dispose of facemask, and immediately clean your hands again with soap and water or alcohol-based hand   Avoid sharing household items with the patient. You should not share dishes, drinking glasses, cups, eating utensils, towels, bedding or other items. After the patient uses these items, you should wash them thoroughly (see below)  Clean all high-touch surfaces, such as counters, tabletops, doorknobs, bathroom fixtures, toilets, phones, keyboards, tablets and bedside tables, every day. Also, clean any surfaces that may have blood, stool, or body fluids on them  Use a household cleaning spray or wipe, according to the label instructions. Labels contain instructions for safe and effective use of the cleaning product including precautions you should take when applying the product, such as wearing gloves and making sure you have good ventilation during the use of the product  Wash laundry thoroughly  Immediately remove and wash clothes or bedding that have blood, stool, or body fluids on them  Wear disposable gloves while handling soiled items and keep soiled items away from your body. Clean your hands (with soap and water or an alcohol-based hand ) immediately after removing and throwing away your gloves  Read and follow directions on labels for laundry or clothing items and detergent. In general, using a normal laundry detergent according to washing machine instructions and dry thoroughly using the warmest temperatures recommended on the clothing label  Place all used disposable gloves, facemasks, and other contaminated items in a lined container before disposing of them with other household waste. Clean your hands (with soap and water or an alcohol-based hand ) immediately after handling these items.  Soap and water should be used preferentially if hands are visibly dirty  Discuss any additional questions with your state or local health department or health care provider    What if I live with someone who's been told to self-quarantine? If the person you live with is NOT exhibiting respiratory symptoms, you can go about your day-to-day business, and you do not need to be tested or monitored. If the person you live with has respiratory symptoms (such as coughing or sneezing), but has not yet tested positive for COVID-19:  Please make sure to stay home  Monitor your symptoms closely, and seek medical attention if your symptoms are worsening   Avoid public areas and public transportation  Wear a facemask if you are sick  Cover your coughs and sneezes with a tissue, dispose of the tissue and immediately wash your hands  Wash your hands often for at least 20 seconds, and if soap and water are not available, use hand    Avoid touching your eyes, nose or mouth  Avoid sharing personal household items  Clean 'high-touch' surfaces daily  If the person you live with has tested positive for COVID-19, you will be considered a close contact, and will also likely be asked to self-quarantine.

## 2024-08-07 ENCOUNTER — APPOINTMENT (OUTPATIENT)
Dept: GENERAL RADIOLOGY | Age: 74
End: 2024-08-07
Payer: MEDICARE

## 2024-08-07 ENCOUNTER — HOSPITAL ENCOUNTER (EMERGENCY)
Age: 74
Discharge: HOME | End: 2024-08-07
Attending: EMERGENCY MEDICINE
Payer: MEDICARE

## 2024-08-07 VITALS
HEIGHT: 68 IN | WEIGHT: 190 LBS | DIASTOLIC BLOOD PRESSURE: 71 MMHG | OXYGEN SATURATION: 97 % | SYSTOLIC BLOOD PRESSURE: 144 MMHG | HEART RATE: 60 BPM | TEMPERATURE: 97.9 F | BODY MASS INDEX: 28.79 KG/M2 | RESPIRATION RATE: 14 BRPM
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
## 2024-08-07 DIAGNOSIS — S40.861A INSECT BITE OF RIGHT UPPER EXTREMITY, INITIAL ENCOUNTER: ICD-10-CM

## 2024-08-07 DIAGNOSIS — R41.89 BRAIN FOG: ICD-10-CM

## 2024-08-07 DIAGNOSIS — R00.2 PALPITATIONS: Primary | ICD-10-CM

## 2024-08-07 LAB
ALBUMIN SERPL-MCNC: 4.3 G/DL (ref 3.4–5)
ALBUMIN/GLOB SERPL: 1.6 {RATIO} (ref 1.1–2.2)
ALP SERPL-CCNC: 82 U/L (ref 40–129)
ALT SERPL-CCNC: 24 U/L (ref 10–40)
ANION GAP SERPL CALCULATED.3IONS-SCNC: 11 MMOL/L (ref 3–16)
AST SERPL-CCNC: 21 U/L (ref 15–37)
BASOPHILS # BLD: 0 K/UL (ref 0–0.2)
BASOPHILS NFR BLD: 0.8 %
BILIRUB SERPL-MCNC: 0.4 MG/DL (ref 0–1)
BUN SERPL-MCNC: 14 MG/DL (ref 7–20)
CALCIUM SERPL-MCNC: 9.3 MG/DL (ref 8.3–10.6)
CHLORIDE SERPL-SCNC: 100 MMOL/L (ref 99–110)
CO2 SERPL-SCNC: 23 MMOL/L (ref 21–32)
CREAT SERPL-MCNC: 0.7 MG/DL (ref 0.8–1.3)
DEPRECATED RDW RBC AUTO: 13.2 % (ref 12.4–15.4)
EOSINOPHIL # BLD: 0.1 K/UL (ref 0–0.6)
EOSINOPHIL NFR BLD: 1.5 %
GFR SERPLBLD CREATININE-BSD FMLA CKD-EPI: >90 ML/MIN/{1.73_M2}
GLUCOSE SERPL-MCNC: 106 MG/DL (ref 70–99)
HCT VFR BLD AUTO: 44.4 % (ref 40.5–52.5)
HGB BLD-MCNC: 14.7 G/DL (ref 13.5–17.5)
LYMPHOCYTES # BLD: 1.1 K/UL (ref 1–5.1)
LYMPHOCYTES NFR BLD: 21.5 %
MAGNESIUM SERPL-MCNC: 2.2 MG/DL (ref 1.8–2.4)
MCH RBC QN AUTO: 30.5 PG (ref 26–34)
MCHC RBC AUTO-ENTMCNC: 33.1 G/DL (ref 31–36)
MCV RBC AUTO: 92.4 FL (ref 80–100)
MONOCYTES # BLD: 0.4 K/UL (ref 0–1.3)
MONOCYTES NFR BLD: 8.1 %
NEUTROPHILS # BLD: 3.3 K/UL (ref 1.7–7.7)
NEUTROPHILS NFR BLD: 68.1 %
NT-PROBNP SERPL-MCNC: 53 PG/ML (ref 0–124)
PLATELET # BLD AUTO: 190 K/UL (ref 135–450)
PMV BLD AUTO: 7.9 FL (ref 5–10.5)
POTASSIUM SERPL-SCNC: 4 MMOL/L (ref 3.5–5.1)
PROT SERPL-MCNC: 7 G/DL (ref 6.4–8.2)
RBC # BLD AUTO: 4.81 M/UL (ref 4.2–5.9)
SODIUM SERPL-SCNC: 134 MMOL/L (ref 136–145)
TROPONIN, HIGH SENSITIVITY: 6 NG/L (ref 0–22)
TROPONIN, HIGH SENSITIVITY: 6 NG/L (ref 0–22)
WBC # BLD AUTO: 4.9 K/UL (ref 4–11)

## 2024-08-07 PROCEDURE — 96360 HYDRATION IV INFUSION INIT: CPT

## 2024-08-07 PROCEDURE — 85025 COMPLETE CBC W/AUTO DIFF WBC: CPT

## 2024-08-07 PROCEDURE — 83735 ASSAY OF MAGNESIUM: CPT

## 2024-08-07 PROCEDURE — 93005 ELECTROCARDIOGRAM TRACING: CPT

## 2024-08-07 PROCEDURE — 99285 EMERGENCY DEPT VISIT HI MDM: CPT

## 2024-08-07 PROCEDURE — 84484 ASSAY OF TROPONIN QUANT: CPT

## 2024-08-07 PROCEDURE — 71046 X-RAY EXAM CHEST 2 VIEWS: CPT

## 2024-08-07 PROCEDURE — 83880 ASSAY OF NATRIURETIC PEPTIDE: CPT

## 2024-08-07 PROCEDURE — 2580000003 HC RX 258

## 2024-08-07 PROCEDURE — 80053 COMPREHEN METABOLIC PANEL: CPT

## 2024-08-07 RX ORDER — SODIUM CHLORIDE, SODIUM LACTATE, POTASSIUM CHLORIDE, AND CALCIUM CHLORIDE .6; .31; .03; .02 G/100ML; G/100ML; G/100ML; G/100ML
500 INJECTION, SOLUTION INTRAVENOUS ONCE
Status: COMPLETED | OUTPATIENT
Start: 2024-08-07 | End: 2024-08-07

## 2024-08-07 RX ADMIN — SODIUM CHLORIDE, POTASSIUM CHLORIDE, SODIUM LACTATE AND CALCIUM CHLORIDE 500 ML: 600; 310; 30; 20 INJECTION, SOLUTION INTRAVENOUS at 11:00

## 2024-08-07 ASSESSMENT — PAIN SCALES - GENERAL: PAINLEVEL_OUTOF10: 0

## 2024-08-07 ASSESSMENT — LIFESTYLE VARIABLES
HOW MANY STANDARD DRINKS CONTAINING ALCOHOL DO YOU HAVE ON A TYPICAL DAY: 1 OR 2
HOW OFTEN DO YOU HAVE A DRINK CONTAINING ALCOHOL: MONTHLY OR LESS

## 2024-08-07 ASSESSMENT — PAIN - FUNCTIONAL ASSESSMENT: PAIN_FUNCTIONAL_ASSESSMENT: 0-10

## 2024-08-07 NOTE — ED PROVIDER NOTES
[facility]  ED  Emergency Department Encounter    Patient Name: Eben Rincon  MRN: [id number]  YOB: 1950  Date of Evaluation: 8/7/2024  Provider: Kell Rojas MD  Note Started: 10:41 AM EDT 8/7/24    CHIEF COMPLAINT  Chest Pain (Intermittent chest pain/ heart racing for a week. /Patient compares the pain to indigestion.  Patient also reports intermittent confusion over the last week./)    SHARED SERVICE VISIT   I have seen and evaluated this patient with my supervising physician Dr. Browning.    HISTORY OF PRESENT ILLNESS  History From: Patient.    Limitations to history : None    Social Determinants Significantly Affecting Health : None    Eben Rincon is a 74 y.o. male who presents to the ED for evaluation of palpitations onset approximately 1 week.  Patient states that for the past week he has been having intermittent episodes of palpitations feeling like his heart is racing.  Patient admits to some associated brain fog feeling like he is unable to concentrate.  Patient also states that he noticed a bug bite on his right upper arm approximately 4 days ago however is unsure if this is related.  Patient admits to history of coronary artery disease and has 4 stents in his heart.  Patient also admits to history of diabetes and hyperlipidemia.  Patient denies any shortness of breath however does admit to intermittent chest discomfort with his palpitations.  Patient denies fever, chills, cough, abdominal pain, nausea, vomiting, changes in bowels, dysuria, hematuria, neck pain, and back pain..    No other complaints, modifying factors or associated symptoms.     Nursing notes reviewed were all reviewed and agreed with or any disagreements were addressed in the HPI.    PMH:  Past Medical History:   Diagnosis Date    Arthritis     Blood transfusion     at age 17 years    CAD (coronary artery disease) 2004    3 STENTS JULY, 1 STENT DECEMBER  PLACEMENT    Diverticulitis     GERD 
appropriately.  Psych: Normal mood and affect  HEENT: NCAT, EOMI, PERRL, MMM, no nystagmus, normal visual fields in all 4 quadrants  Neck: supple, no JVD, nontender to palpation, normal range of motion, no nuchal rigidity  Cardiac: RRR, pulses 2+ in all 4 extremities including bilateral radial pulses and left dorsalis pedis pulse and right PT, no calf swelling or tenderness bilaterally   Lungs: C2AB, no R/R/W  Abdomen: soft and nontender with no R/D/G, no pulsatile mass  Neuro: no focal neuro deficits with strength and sensation 5/5 in all 4 extremities, NIHSS = 0  Skin: Small insect bite noted to the right upper arm but no target lesions to suggest tick bite and appears more like a typical mosquito bite with minimal surrounding erythema, nontender to the touch, blanchable, no abscess, no purpura or petechiae      The Ekg interpreted by me shows  normal sinus rhythm with a rate of 68  Axis is   Normal  QTc is  normal  Intervals and Durations are unremarkable.      ST Segments: no acute change and nonspecific changes  No significant change from prior EKG dated - 8/9/22  No STEMI, RBBB present today, no signs of Brugada syndrome currently, one beat in aVF with poor baseline and no concern for STEMI at this time even though the one beat shows 1 mm ST elevation but again I do not suspect this to be a true finding at this time and more likely artifact         I independently interpreted the following study(s) labs which show initial troponin was negative and repeat troponin was unchanged.  His white blood cell count was normal range.  His electrolytes were normal range.                 I was the primary provider for the patient along with CHANDAN Stephenson.    MDM: Patient was evaluated due to concern for having a slight fog in regards to thinking over the last couple of days and some vague chest discomfort associated with palpitations.  Story not suggestive of acute coronary syndrome.  Repeat troponin was negative.  After the

## 2024-08-09 LAB
EKG ATRIAL RATE: 68 BPM
EKG DIAGNOSIS: NORMAL
EKG P AXIS: 65 DEGREES
EKG P-R INTERVAL: 194 MS
EKG Q-T INTERVAL: 396 MS
EKG QRS DURATION: 140 MS
EKG QTC CALCULATION (BAZETT): 421 MS
EKG R AXIS: -9 DEGREES
EKG T AXIS: 21 DEGREES
EKG VENTRICULAR RATE: 68 BPM

## 2024-08-09 PROCEDURE — 93010 ELECTROCARDIOGRAM REPORT: CPT | Performed by: INTERNAL MEDICINE

## 2024-10-11 ENCOUNTER — HOSPITAL ENCOUNTER (EMERGENCY)
Age: 74
Discharge: HOME | End: 2024-10-11
Payer: MEDICARE

## 2024-10-11 VITALS
RESPIRATION RATE: 18 BRPM | SYSTOLIC BLOOD PRESSURE: 129 MMHG | DIASTOLIC BLOOD PRESSURE: 94 MMHG | TEMPERATURE: 98.3 F | HEART RATE: 81 BPM | OXYGEN SATURATION: 95 %

## 2024-10-11 DIAGNOSIS — N30.90 CYSTITIS: Primary | ICD-10-CM

## 2024-10-11 LAB
BACTERIA URNS QL MICRO: ABNORMAL /HPF
BILIRUB UR QL STRIP.AUTO: NEGATIVE
CLARITY UR: ABNORMAL
COLOR UR: YELLOW
EPI CELLS #/AREA URNS HPF: ABNORMAL /HPF (ref 0–5)
GLUCOSE UR STRIP.AUTO-MCNC: NEGATIVE MG/DL
HGB UR QL STRIP.AUTO: ABNORMAL
KETONES UR STRIP.AUTO-MCNC: NEGATIVE MG/DL
LEUKOCYTE ESTERASE UR QL STRIP.AUTO: ABNORMAL
MUCOUS THREADS #/AREA URNS LPF: ABNORMAL /LPF
NITRITE UR QL STRIP.AUTO: POSITIVE
PH UR STRIP.AUTO: 6 [PH] (ref 5–8)
PROT UR STRIP.AUTO-MCNC: 30 MG/DL
RBC #/AREA URNS HPF: >100 /HPF (ref 0–4)
SP GR UR STRIP.AUTO: 1.02 (ref 1–1.03)
UA COMPLETE W REFLEX CULTURE PNL UR: YES
UA DIPSTICK W REFLEX MICRO PNL UR: YES
URN SPEC COLLECT METH UR: ABNORMAL
UROBILINOGEN UR STRIP-ACNC: 0.2 E.U./DL
WBC #/AREA URNS HPF: ABNORMAL /HPF (ref 0–5)

## 2024-10-11 PROCEDURE — 87591 N.GONORRHOEAE DNA AMP PROB: CPT

## 2024-10-11 PROCEDURE — 6370000000 HC RX 637 (ALT 250 FOR IP): Performed by: PHYSICIAN ASSISTANT

## 2024-10-11 PROCEDURE — 99283 EMERGENCY DEPT VISIT LOW MDM: CPT

## 2024-10-11 PROCEDURE — 87088 URINE BACTERIA CULTURE: CPT

## 2024-10-11 PROCEDURE — 87491 CHLMYD TRACH DNA AMP PROBE: CPT

## 2024-10-11 PROCEDURE — 87086 URINE CULTURE/COLONY COUNT: CPT

## 2024-10-11 PROCEDURE — 87186 SC STD MICRODIL/AGAR DIL: CPT

## 2024-10-11 PROCEDURE — 81001 URINALYSIS AUTO W/SCOPE: CPT

## 2024-10-11 RX ORDER — CEFUROXIME AXETIL 250 MG/1
250 TABLET ORAL 2 TIMES DAILY
Qty: 14 TABLET | Refills: 0 | Status: SHIPPED | OUTPATIENT
Start: 2024-10-11 | End: 2024-10-18

## 2024-10-11 RX ORDER — CEFUROXIME AXETIL 250 MG/1
500 TABLET ORAL ONCE
Status: COMPLETED | OUTPATIENT
Start: 2024-10-11 | End: 2024-10-11

## 2024-10-11 RX ADMIN — CEFUROXIME AXETIL 500 MG: 250 TABLET, FILM COATED ORAL at 18:53

## 2024-10-11 ASSESSMENT — PAIN SCALES - GENERAL: PAINLEVEL_OUTOF10: 0

## 2024-10-11 ASSESSMENT — PAIN - FUNCTIONAL ASSESSMENT: PAIN_FUNCTIONAL_ASSESSMENT: 0-10
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
## 2024-10-13 LAB
BACTERIA UR CULT: ABNORMAL
ORGANISM: ABNORMAL

## 2024-10-14 LAB
C TRACH DNA UR QL NAA+PROBE: NEGATIVE
N GONORRHOEA DNA UR QL NAA+PROBE: NEGATIVE

## 2025-05-05 ENCOUNTER — HOSPITAL ENCOUNTER (EMERGENCY)
Age: 75
Discharge: HOME | End: 2025-05-05
Payer: MEDICARE

## 2025-05-05 ENCOUNTER — APPOINTMENT (OUTPATIENT)
Dept: CT IMAGING | Age: 75
End: 2025-05-05
Payer: MEDICARE

## 2025-05-05 VITALS
SYSTOLIC BLOOD PRESSURE: 137 MMHG | OXYGEN SATURATION: 95 % | HEART RATE: 67 BPM | HEIGHT: 68 IN | BODY MASS INDEX: 31.19 KG/M2 | RESPIRATION RATE: 17 BRPM | DIASTOLIC BLOOD PRESSURE: 72 MMHG | TEMPERATURE: 98.2 F | WEIGHT: 205.8 LBS

## 2025-05-05 DIAGNOSIS — K57.32 SIGMOID DIVERTICULITIS: Primary | ICD-10-CM

## 2025-05-05 DIAGNOSIS — K59.00 CONSTIPATION, UNSPECIFIED CONSTIPATION TYPE: ICD-10-CM

## 2025-05-05 DIAGNOSIS — R10.32 ABDOMINAL PAIN, LEFT LOWER QUADRANT: ICD-10-CM

## 2025-05-05 LAB
ALBUMIN SERPL-MCNC: 4 G/DL (ref 3.4–5)
ALBUMIN/GLOB SERPL: 1.3 {RATIO} (ref 1.1–2.2)
ALP SERPL-CCNC: 74 U/L (ref 40–129)
ALT SERPL-CCNC: 21 U/L (ref 10–40)
ANION GAP SERPL CALCULATED.3IONS-SCNC: 15 MMOL/L (ref 3–16)
AST SERPL-CCNC: 25 U/L (ref 15–37)
BACTERIA URNS QL MICRO: ABNORMAL /HPF
BASOPHILS # BLD: 0 K/UL (ref 0–0.2)
BASOPHILS NFR BLD: 0.6 %
BILIRUB SERPL-MCNC: 0.5 MG/DL (ref 0–1)
BILIRUB UR QL STRIP.AUTO: NEGATIVE
BUN SERPL-MCNC: 12 MG/DL (ref 7–20)
CALCIUM SERPL-MCNC: 9.1 MG/DL (ref 8.3–10.6)
CHLORIDE SERPL-SCNC: 103 MMOL/L (ref 99–110)
CLARITY UR: CLEAR
CO2 SERPL-SCNC: 22 MMOL/L (ref 21–32)
COLOR UR: YELLOW
CREAT SERPL-MCNC: 0.8 MG/DL (ref 0.8–1.3)
DEPRECATED RDW RBC AUTO: 13.4 % (ref 12.4–15.4)
EOSINOPHIL # BLD: 0.1 K/UL (ref 0–0.6)
EOSINOPHIL NFR BLD: 1.6 %
GFR SERPLBLD CREATININE-BSD FMLA CKD-EPI: >90 ML/MIN/{1.73_M2}
GLUCOSE SERPL-MCNC: 140 MG/DL (ref 70–99)
GLUCOSE UR STRIP.AUTO-MCNC: NEGATIVE MG/DL
HCT VFR BLD AUTO: 42.7 % (ref 40.5–52.5)
HGB BLD-MCNC: 14.6 G/DL (ref 13.5–17.5)
HGB UR QL STRIP.AUTO: ABNORMAL
KETONES UR STRIP.AUTO-MCNC: NEGATIVE MG/DL
LEUKOCYTE ESTERASE UR QL STRIP.AUTO: NEGATIVE
LIPASE SERPL-CCNC: 33 U/L (ref 13–60)
LYMPHOCYTES # BLD: 1.6 K/UL (ref 1–5.1)
LYMPHOCYTES NFR BLD: 21.6 %
MCH RBC QN AUTO: 31.7 PG (ref 26–34)
MCHC RBC AUTO-ENTMCNC: 34.2 G/DL (ref 31–36)
MCV RBC AUTO: 92.8 FL (ref 80–100)
MONOCYTES # BLD: 0.5 K/UL (ref 0–1.3)
MONOCYTES NFR BLD: 7.3 %
MUCOUS THREADS #/AREA URNS LPF: ABNORMAL /LPF
NEUTROPHILS # BLD: 5.2 K/UL (ref 1.7–7.7)
NEUTROPHILS NFR BLD: 68.9 %
NITRITE UR QL STRIP.AUTO: NEGATIVE
PH UR STRIP.AUTO: 6 [PH] (ref 5–8)
PLATELET # BLD AUTO: 187 K/UL (ref 135–450)
PMV BLD AUTO: 8.1 FL (ref 5–10.5)
POTASSIUM SERPL-SCNC: 4 MMOL/L (ref 3.5–5.1)
PROT SERPL-MCNC: 7 G/DL (ref 6.4–8.2)
PROT UR STRIP.AUTO-MCNC: NEGATIVE MG/DL
RBC # BLD AUTO: 4.6 M/UL (ref 4.2–5.9)
RBC #/AREA URNS HPF: ABNORMAL /HPF (ref 0–4)
SODIUM SERPL-SCNC: 140 MMOL/L (ref 136–145)
SP GR UR STRIP.AUTO: 1.02 (ref 1–1.03)
UA COMPLETE W REFLEX CULTURE PNL UR: ABNORMAL
UA DIPSTICK W REFLEX MICRO PNL UR: YES
URN SPEC COLLECT METH UR: ABNORMAL
UROBILINOGEN UR STRIP-ACNC: 0.2 E.U./DL
WBC # BLD AUTO: 7.5 K/UL (ref 4–11)
WBC #/AREA URNS HPF: ABNORMAL /HPF (ref 0–5)

## 2025-05-05 PROCEDURE — 6370000000 HC RX 637 (ALT 250 FOR IP): Performed by: PHYSICIAN ASSISTANT

## 2025-05-05 PROCEDURE — 83690 ASSAY OF LIPASE: CPT

## 2025-05-05 PROCEDURE — 74177 CT ABD & PELVIS W/CONTRAST: CPT

## 2025-05-05 PROCEDURE — 81001 URINALYSIS AUTO W/SCOPE: CPT

## 2025-05-05 PROCEDURE — 6360000002 HC RX W HCPCS: Performed by: PHYSICIAN ASSISTANT

## 2025-05-05 PROCEDURE — 96374 THER/PROPH/DIAG INJ IV PUSH: CPT

## 2025-05-05 PROCEDURE — 51798 US URINE CAPACITY MEASURE: CPT

## 2025-05-05 PROCEDURE — 99285 EMERGENCY DEPT VISIT HI MDM: CPT

## 2025-05-05 PROCEDURE — 6360000004 HC RX CONTRAST MEDICATION: Performed by: PHYSICIAN ASSISTANT

## 2025-05-05 PROCEDURE — 80053 COMPREHEN METABOLIC PANEL: CPT

## 2025-05-05 PROCEDURE — 85025 COMPLETE CBC W/AUTO DIFF WBC: CPT

## 2025-05-05 RX ORDER — OXYCODONE AND ACETAMINOPHEN 5; 325 MG/1; MG/1
1 TABLET ORAL ONCE
Refills: 0 | Status: COMPLETED | OUTPATIENT
Start: 2025-05-05 | End: 2025-05-05

## 2025-05-05 RX ORDER — METRONIDAZOLE 500 MG/1
500 TABLET ORAL 3 TIMES DAILY
Qty: 30 TABLET | Refills: 0 | Status: SHIPPED | OUTPATIENT
Start: 2025-05-05 | End: 2025-05-15

## 2025-05-05 RX ORDER — IOPAMIDOL 755 MG/ML
75 INJECTION, SOLUTION INTRAVASCULAR
Status: COMPLETED | OUTPATIENT
Start: 2025-05-05 | End: 2025-05-05

## 2025-05-05 RX ORDER — CIPROFLOXACIN 500 MG/1
500 TABLET, FILM COATED ORAL 2 TIMES DAILY
Qty: 20 TABLET | Refills: 0 | Status: SHIPPED | OUTPATIENT
Start: 2025-05-05 | End: 2025-05-15

## 2025-05-05 RX ORDER — METRONIDAZOLE 250 MG/1
500 TABLET ORAL ONCE
Status: COMPLETED | OUTPATIENT
Start: 2025-05-05 | End: 2025-05-05

## 2025-05-05 RX ORDER — ONDANSETRON 2 MG/ML
4 INJECTION INTRAMUSCULAR; INTRAVENOUS ONCE
Status: COMPLETED | OUTPATIENT
Start: 2025-05-05 | End: 2025-05-05

## 2025-05-05 RX ORDER — POLYETHYLENE GLYCOL 3350 17 G/17G
17 POWDER, FOR SOLUTION ORAL DAILY
Qty: 510 G | Refills: 0 | Status: SHIPPED | OUTPATIENT
Start: 2025-05-05 | End: 2025-06-04

## 2025-05-05 RX ORDER — OXYCODONE AND ACETAMINOPHEN 5; 325 MG/1; MG/1
1 TABLET ORAL EVERY 6 HOURS PRN
Qty: 12 TABLET | Refills: 0 | Status: SHIPPED | OUTPATIENT
Start: 2025-05-05 | End: 2025-05-08

## 2025-05-05 RX ORDER — CIPROFLOXACIN 500 MG/1
500 TABLET, FILM COATED ORAL ONCE
Status: COMPLETED | OUTPATIENT
Start: 2025-05-05 | End: 2025-05-05

## 2025-05-05 RX ADMIN — IOPAMIDOL 75 ML: 755 INJECTION, SOLUTION INTRAVENOUS at 16:46

## 2025-05-05 RX ADMIN — CIPROFLOXACIN HYDROCHLORIDE 500 MG: 500 TABLET, FILM COATED ORAL at 17:24

## 2025-05-05 RX ADMIN — ONDANSETRON 4 MG: 2 INJECTION, SOLUTION INTRAMUSCULAR; INTRAVENOUS at 17:22

## 2025-05-05 RX ADMIN — OXYCODONE AND ACETAMINOPHEN 1 TABLET: 5; 325 TABLET ORAL at 17:23

## 2025-05-05 RX ADMIN — METRONIDAZOLE 500 MG: 250 TABLET ORAL at 17:24

## 2025-05-05 ASSESSMENT — PAIN DESCRIPTION - ORIENTATION: ORIENTATION: LOWER;MID

## 2025-05-05 ASSESSMENT — PAIN SCALES - GENERAL
PAINLEVEL_OUTOF10: 6
PAINLEVEL_OUTOF10: 3

## 2025-05-05 ASSESSMENT — PAIN - FUNCTIONAL ASSESSMENT: PAIN_FUNCTIONAL_ASSESSMENT: 0-10

## 2025-05-05 ASSESSMENT — PAIN DESCRIPTION - DESCRIPTORS: DESCRIPTORS: ACHING

## 2025-05-05 ASSESSMENT — LIFESTYLE VARIABLES
HOW OFTEN DO YOU HAVE A DRINK CONTAINING ALCOHOL: NEVER
HOW MANY STANDARD DRINKS CONTAINING ALCOHOL DO YOU HAVE ON A TYPICAL DAY: PATIENT DOES NOT DRINK

## 2025-05-05 ASSESSMENT — PAIN DESCRIPTION - LOCATION: LOCATION: ABDOMEN

## 2025-05-05 NOTE — ED NOTES
Discharge with instructions and medications prescribed, verbalized understanding. Left stable ambulatory.

## 2025-05-05 NOTE — ED PROVIDER NOTES
[facility] EMERGENCY DEPARTMENT  EMERGENCY DEPARTMENT ENCOUNTER        Pt Name: Eben Rincon  MRN: [id number]  Birthdate 1950  Date of evaluation: 5/5/2025  Provider: ANTON COLLADO PA-C  PCP: Kell Rojas MD  ED Attending: MD Nam      ALANIS. I have evaluated this patient.      CHIEF COMPLAINT:     Chief Complaint   Patient presents with    Constipation     Constipation x1 week    Abdominal Pain     Lower abdominal pain when attempting to urinate.        HISTORY OF PRESENT ILLNESS:      History provided by the patient. No limitations.    Eben Rincon is a 74 y.o. male who arrives to the ED by private vehicle.  Patient states for approximately 1 week he has felt constipated.  He is used to moving his bowels regularly, daily.  However, states it has been about a week since he had a good bowel movement.  Over the past 4 days he has developed left lower quadrant abdominal pain and describes feeling pain just before urinating though is not having any actual dysuria, hematuria, urgency, frequency.  He reports a history of diverticulosis and diverticulitis.  He wonders if he could have that.  No fevers or chills, nausea or vomiting.  No identifiable exacerbating or alleviating factors to symptoms.    Nursing Notes were reviewed     REVIEW OF SYSTEMS:     Review of Systems  Positives and pertinent negatives as per HPI.      PAST MEDICAL HISTORY:     Past Medical History:   Diagnosis Date    Arthritis     Blood transfusion     at age 17 years    CAD (coronary artery disease) 2004    3 STENTS JULY, 1 STENT DECEMBER  PLACEMENT    Diverticulitis     GERD (gastroesophageal reflux disease)     Hyperlipidemia     Prostate CA (HCC)     Prostate cancer (HCC)        SURGICAL HISTORY:      Past Surgical History:   Procedure Laterality Date    CARDIAC SURGERY      CARPAL TUNNEL RELEASE Right     CATARACT REMOVAL WITH IMPLANT Left     COLONOSCOPY      CORONARY ANGIOPLASTY WITH STENT PLACEMENT      EYE SURGERY Right

## 2025-08-24 ENCOUNTER — APPOINTMENT (OUTPATIENT)
Dept: GENERAL RADIOLOGY | Age: 75
End: 2025-08-24
Payer: MEDICARE

## 2025-08-24 ENCOUNTER — HOSPITAL ENCOUNTER (EMERGENCY)
Age: 75
Discharge: HOME | End: 2025-08-24
Payer: MEDICARE

## 2025-08-24 ENCOUNTER — APPOINTMENT (OUTPATIENT)
Dept: CT IMAGING | Age: 75
End: 2025-08-24
Payer: MEDICARE

## 2025-08-24 VITALS
HEART RATE: 69 BPM | TEMPERATURE: 97.5 F | OXYGEN SATURATION: 99 % | WEIGHT: 192 LBS | RESPIRATION RATE: 18 BRPM | HEIGHT: 68 IN | SYSTOLIC BLOOD PRESSURE: 138 MMHG | DIASTOLIC BLOOD PRESSURE: 74 MMHG | BODY MASS INDEX: 29.1 KG/M2

## 2025-08-24 DIAGNOSIS — R20.2 PARESTHESIA: Primary | ICD-10-CM

## 2025-08-24 LAB
ALBUMIN SERPL-MCNC: 4 G/DL (ref 3.4–5)
ALBUMIN/GLOB SERPL: 1.8 {RATIO} (ref 1.1–2.2)
ALP SERPL-CCNC: 72 U/L (ref 40–129)
ALT SERPL-CCNC: 23 U/L (ref 10–40)
ANION GAP SERPL CALCULATED.3IONS-SCNC: 10 MMOL/L (ref 3–16)
AST SERPL-CCNC: 22 U/L (ref 15–37)
BASE EXCESS BLDV CALC-SCNC: -3.5 MMOL/L (ref -3–3)
BASOPHILS # BLD: 0.2 K/UL (ref 0–0.2)
BASOPHILS NFR BLD: 3 %
BILIRUB SERPL-MCNC: 0.3 MG/DL (ref 0–1)
BUN SERPL-MCNC: 16 MG/DL (ref 7–20)
CALCIUM SERPL-MCNC: 9.2 MG/DL (ref 8.3–10.6)
CHLORIDE SERPL-SCNC: 101 MMOL/L (ref 99–110)
CHP ED QC CHECK: YES
CO2 BLDV-SCNC: 22 MMOL/L
CO2 SERPL-SCNC: 23 MMOL/L (ref 21–32)
COHGB MFR BLDV: 0.8 % (ref 0–1.5)
CREAT SERPL-MCNC: 0.6 MG/DL (ref 0.8–1.3)
DEPRECATED RDW RBC AUTO: 13.5 % (ref 12.4–15.4)
EOSINOPHIL # BLD: 0.1 K/UL (ref 0–0.6)
EOSINOPHIL NFR BLD: 1.7 %
GFR SERPLBLD CREATININE-BSD FMLA CKD-EPI: >90 ML/MIN/{1.73_M2}
GLUCOSE BLD-MCNC: 131 MG/DL
GLUCOSE BLD-MCNC: 131 MG/DL (ref 70–99)
GLUCOSE SERPL-MCNC: 110 MG/DL (ref 70–99)
HCO3 BLDV-SCNC: 21 MMOL/L (ref 23–29)
HCT VFR BLD AUTO: 42.3 % (ref 40.5–52.5)
HGB BLD-MCNC: 14.3 G/DL (ref 13.5–17.5)
LYMPHOCYTES # BLD: 1.3 K/UL (ref 1–5.1)
LYMPHOCYTES NFR BLD: 23.7 %
MCH RBC QN AUTO: 31.2 PG (ref 26–34)
MCHC RBC AUTO-ENTMCNC: 33.8 G/DL (ref 31–36)
MCV RBC AUTO: 92.2 FL (ref 80–100)
METHGB MFR BLDV: 0 %
MONOCYTES # BLD: 0.5 K/UL (ref 0–1.3)
MONOCYTES NFR BLD: 9.3 %
NEUTROPHILS # BLD: 3.3 K/UL (ref 1.7–7.7)
NEUTROPHILS NFR BLD: 62.3 %
NT-PROBNP SERPL-MCNC: 56 PG/ML (ref 0–449)
O2 CT VFR BLDV CALC: 20 VOL %
O2 THERAPY: ABNORMAL
PCO2 BLDV: 36.8 MMHG (ref 40–50)
PERFORMED ON: ABNORMAL
PH BLDV: 7.38 [PH] (ref 7.35–7.45)
PLATELET # BLD AUTO: 176 K/UL (ref 135–450)
PMV BLD AUTO: 7.9 FL (ref 5–10.5)
PO2 BLDV: 74.6 MMHG (ref 25–40)
POTASSIUM SERPL-SCNC: 3.7 MMOL/L (ref 3.5–5.1)
PROT SERPL-MCNC: 6.2 G/DL (ref 6.4–8.2)
RBC # BLD AUTO: 4.58 M/UL (ref 4.2–5.9)
SAO2 % BLDV: 95 %
SODIUM SERPL-SCNC: 134 MMOL/L (ref 136–145)
TROPONIN, HIGH SENSITIVITY: <6 NG/L (ref 0–22)
TROPONIN, HIGH SENSITIVITY: <6 NG/L (ref 0–22)
WBC # BLD AUTO: 5.3 K/UL (ref 4–11)

## 2025-08-24 PROCEDURE — 93005 ELECTROCARDIOGRAM TRACING: CPT | Performed by: NURSE PRACTITIONER

## 2025-08-24 PROCEDURE — 84484 ASSAY OF TROPONIN QUANT: CPT

## 2025-08-24 PROCEDURE — 70450 CT HEAD/BRAIN W/O DYE: CPT

## 2025-08-24 PROCEDURE — 99285 EMERGENCY DEPT VISIT HI MDM: CPT

## 2025-08-24 PROCEDURE — 83880 ASSAY OF NATRIURETIC PEPTIDE: CPT

## 2025-08-24 PROCEDURE — 71045 X-RAY EXAM CHEST 1 VIEW: CPT

## 2025-08-24 PROCEDURE — 85025 COMPLETE CBC W/AUTO DIFF WBC: CPT

## 2025-08-24 PROCEDURE — 36415 COLL VENOUS BLD VENIPUNCTURE: CPT

## 2025-08-24 PROCEDURE — 80053 COMPREHEN METABOLIC PANEL: CPT

## 2025-08-24 PROCEDURE — 82803 BLOOD GASES ANY COMBINATION: CPT

## 2025-08-24 ASSESSMENT — PAIN - FUNCTIONAL ASSESSMENT: PAIN_FUNCTIONAL_ASSESSMENT: 0-10

## 2025-08-24 ASSESSMENT — PAIN DESCRIPTION - LOCATION: LOCATION: HEAD

## 2025-08-24 ASSESSMENT — PAIN SCALES - GENERAL: PAINLEVEL_OUTOF10: 6

## 2025-08-25 LAB
EKG ATRIAL RATE: 64 BPM
EKG DIAGNOSIS: NORMAL
EKG P AXIS: 27 DEGREES
EKG P-R INTERVAL: 212 MS
EKG Q-T INTERVAL: 418 MS
EKG QRS DURATION: 146 MS
EKG QTC CALCULATION (BAZETT): 431 MS
EKG R AXIS: 4 DEGREES
EKG T AXIS: 34 DEGREES
EKG VENTRICULAR RATE: 64 BPM

## 2025-08-25 PROCEDURE — 93010 ELECTROCARDIOGRAM REPORT: CPT | Performed by: INTERNAL MEDICINE

## (undated) DEVICE — GAUZE,SPONGE,4"X4",8PLY,STRL,LF,10/TRAY: Brand: MEDLINE

## (undated) DEVICE — APPLICATOR MEDICATED 26 CC SOLUTION HI LT ORNG CHLORAPREP

## (undated) DEVICE — SYRINGE MED 10ML LUERLOCK TIP W/O SFTY DISP

## (undated) DEVICE — TIP SUCT DIA12FR W STYL CTRL VENT DISPOSABLE FRAZ

## (undated) DEVICE — BANDAGE,GAUZE,4.5"X4.1YD,STERILE,LF: Brand: MEDLINE

## (undated) DEVICE — NEEDLE HYPO 25GA L1.5IN BLU POLYPR HUB S STL REG BVL STR

## (undated) DEVICE — GOWN SIRUS NONREIN XL W/TWL: Brand: MEDLINE INDUSTRIES, INC.

## (undated) DEVICE — SOLUTION IV IRRIG 500ML 0.9% SODIUM CHL 2F7123

## (undated) DEVICE — PACK,UNIVERSAL,SPLIT,II,AURORA: Brand: MEDLINE

## (undated) DEVICE — ELECTRODE PT RET AD L9FT HI MOIST COND ADH HYDRGEL CORDED

## (undated) DEVICE — CANNULA NSL 13FT TUBE AD ETCO2 DIV SAMP M

## (undated) DEVICE — SUTURE VCRL SZ 3-0 L18IN ABSRB UD L26MM SH 1/2 CIR J864D

## (undated) DEVICE — GOWN,AURORA,NONREINF,RAGLAN,XXL,STERILE: Brand: MEDLINE

## (undated) DEVICE — MAJOR SET UP PK

## (undated) DEVICE — GLOVE ORANGE PI 8 1/2   MSG9085